# Patient Record
Sex: FEMALE | Race: WHITE | NOT HISPANIC OR LATINO | URBAN - METROPOLITAN AREA
[De-identification: names, ages, dates, MRNs, and addresses within clinical notes are randomized per-mention and may not be internally consistent; named-entity substitution may affect disease eponyms.]

---

## 2023-08-07 ENCOUNTER — APPOINTMENT (OUTPATIENT)
Dept: LAB | Facility: LAB | Age: 23
End: 2023-08-07
Payer: COMMERCIAL

## 2023-11-16 ENCOUNTER — TELEPHONE (OUTPATIENT)
Dept: HEMATOLOGY/ONCOLOGY | Facility: HOSPITAL | Age: 23
End: 2023-11-16
Payer: COMMERCIAL

## 2023-11-16 NOTE — TELEPHONE ENCOUNTER
RN called patient and left message on voice mail for her to call back. Call back instruction reviewed.

## 2023-11-22 ENCOUNTER — APPOINTMENT (OUTPATIENT)
Dept: HEMATOLOGY/ONCOLOGY | Facility: HOSPITAL | Age: 23
End: 2023-11-22
Payer: COMMERCIAL

## 2024-07-19 ENCOUNTER — ALLIED HEALTH (OUTPATIENT)
Dept: INTEGRATIVE MEDICINE | Facility: HOSPITAL | Age: 24
End: 2024-07-19
Payer: COMMERCIAL

## 2024-07-19 VITALS — SYSTOLIC BLOOD PRESSURE: 127 MMHG | TEMPERATURE: 98.2 F | DIASTOLIC BLOOD PRESSURE: 79 MMHG | HEART RATE: 93 BPM

## 2024-07-19 DIAGNOSIS — G90.A POTS (POSTURAL ORTHOSTATIC TACHYCARDIA SYNDROME): ICD-10-CM

## 2024-07-19 DIAGNOSIS — K31.84 GASTROPARESIS: ICD-10-CM

## 2024-07-19 DIAGNOSIS — R01.1 MURMUR, CARDIAC: ICD-10-CM

## 2024-07-19 DIAGNOSIS — E86.1 HYPOVOLEMIA: ICD-10-CM

## 2024-07-19 DIAGNOSIS — R11.10 CHRONIC VOMITING: ICD-10-CM

## 2024-07-19 DIAGNOSIS — K31.89 GASTRIC DYSMOTILITY: ICD-10-CM

## 2024-07-19 DIAGNOSIS — K21.9 CHRONIC GASTROESOPHAGEAL REFLUX DISEASE: ICD-10-CM

## 2024-07-19 DIAGNOSIS — J30.9 CHRONIC ALLERGIC RHINITIS: ICD-10-CM

## 2024-07-19 DIAGNOSIS — G90.1 DYSAUTONOMIA (MULTI): ICD-10-CM

## 2024-07-19 DIAGNOSIS — R63.39 FEEDING INTOLERANCE: ICD-10-CM

## 2024-07-19 DIAGNOSIS — Z91.018 FOOD ALLERGY: ICD-10-CM

## 2024-07-19 DIAGNOSIS — R11.0 CHRONIC NAUSEA: ICD-10-CM

## 2024-07-19 DIAGNOSIS — R51.9 HEADACHE, CHRONIC DAILY: ICD-10-CM

## 2024-07-19 DIAGNOSIS — K21.9 GASTROESOPHAGEAL REFLUX DISEASE WITHOUT ESOPHAGITIS: Primary | ICD-10-CM

## 2024-07-19 DIAGNOSIS — G93.32 CHRONIC FATIGUE SYNDROME: ICD-10-CM

## 2024-07-19 DIAGNOSIS — D89.40 MAST CELL ACTIVATION SYNDROME (MULTI): ICD-10-CM

## 2024-07-19 DIAGNOSIS — Q79.60 EHLERS-DANLOS SYNDROME (HHS-HCC): ICD-10-CM

## 2024-07-19 DIAGNOSIS — Z95.828 PORT-A-CATH IN PLACE: ICD-10-CM

## 2024-07-19 PROCEDURE — 99205 OFFICE O/P NEW HI 60 MIN: CPT | Performed by: PEDIATRICS

## 2024-07-19 PROCEDURE — 99215 OFFICE O/P EST HI 40 MIN: CPT | Performed by: PEDIATRICS

## 2024-07-19 RX ORDER — LIDOCAINE AND PRILOCAINE 25; 25 MG/G; MG/G
1 CREAM TOPICAL
COMMUNITY
Start: 2024-04-23

## 2024-07-19 RX ORDER — AZELASTINE HYDROCHLORIDE, FLUTICASONE PROPIONATE 137; 50 UG/1; UG/1
1 SPRAY, METERED NASAL 2 TIMES DAILY
COMMUNITY
Start: 2024-07-02

## 2024-07-19 RX ORDER — ONDANSETRON 4 MG/1
4 TABLET, ORALLY DISINTEGRATING ORAL EVERY 8 HOURS PRN
COMMUNITY
Start: 2023-03-08

## 2024-07-19 RX ORDER — ALBUTEROL SULFATE 90 UG/1
2 AEROSOL, METERED RESPIRATORY (INHALATION) EVERY 4 HOURS PRN
COMMUNITY
Start: 2024-04-23

## 2024-07-19 RX ORDER — PROMETHAZINE HYDROCHLORIDE 12.5 MG/1
12.5 TABLET ORAL EVERY 6 HOURS PRN
COMMUNITY
Start: 2023-05-17

## 2024-07-19 RX ORDER — BUDESONIDE AND FORMOTEROL FUMARATE DIHYDRATE 80; 4.5 UG/1; UG/1
2 AEROSOL RESPIRATORY (INHALATION) 2 TIMES DAILY
COMMUNITY
Start: 2024-07-02

## 2024-07-19 RX ORDER — IBUPROFEN 200 MG
800 TABLET ORAL EVERY 6 HOURS PRN
COMMUNITY

## 2024-07-19 RX ORDER — EPINEPHRINE 0.3 MG/.3ML
0.3 INJECTION SUBCUTANEOUS DAILY PRN
COMMUNITY
Start: 2023-07-05

## 2024-07-19 RX ORDER — CETIRIZINE HYDROCHLORIDE 10 MG/1
10 TABLET ORAL
COMMUNITY

## 2024-07-19 RX ORDER — ACETAMINOPHEN 500 MG
1000 TABLET ORAL EVERY 6 HOURS PRN
COMMUNITY

## 2024-07-19 RX ORDER — DOCUSATE SODIUM 100 MG/1
100 CAPSULE, LIQUID FILLED ORAL 2 TIMES DAILY PRN
COMMUNITY

## 2024-07-19 RX ORDER — MILK THISTLE 150 MG
1000 CAPSULE ORAL
COMMUNITY

## 2024-07-19 RX ORDER — HYDROCORTISONE 1 G/100G
1 CREAM TOPICAL DAILY PRN
COMMUNITY

## 2024-07-22 PROBLEM — Z96.7 FIXATION HARDWARE IN SPINE: Status: ACTIVE | Noted: 2024-07-22

## 2024-07-22 PROBLEM — R11.0 CHRONIC NAUSEA: Status: ACTIVE | Noted: 2024-07-22

## 2024-07-22 PROBLEM — Z98.1 HX OF SPINAL FUSION: Status: ACTIVE | Noted: 2018-03-08

## 2024-07-22 PROBLEM — M25.569 KNEE PAIN: Status: ACTIVE | Noted: 2024-07-22

## 2024-07-22 PROBLEM — M35.9 CONNECTIVE TISSUE DISORDER (MULTI): Status: ACTIVE | Noted: 2024-07-22

## 2024-07-22 PROBLEM — K21.9 CHRONIC GASTROESOPHAGEAL REFLUX DISEASE: Status: ACTIVE | Noted: 2024-07-22

## 2024-07-22 PROBLEM — E73.9 LACTOSE INTOLERANCE: Status: ACTIVE | Noted: 2024-07-22

## 2024-07-22 PROBLEM — K90.41 GLUTEN INTOLERANCE: Status: ACTIVE | Noted: 2024-07-22

## 2024-07-22 PROBLEM — L50.8 CHRONIC URTICARIA: Status: ACTIVE | Noted: 2021-12-28

## 2024-07-22 PROBLEM — Z91.018 FOOD ALLERGY: Status: ACTIVE | Noted: 2024-07-22

## 2024-07-22 PROBLEM — M25.551 HIP PAIN, BILATERAL: Status: ACTIVE | Noted: 2024-07-22

## 2024-07-22 PROBLEM — Z95.828 PORT-A-CATH IN PLACE: Status: ACTIVE | Noted: 2024-07-22

## 2024-07-22 PROBLEM — R51.9 HEADACHE, CHRONIC DAILY: Status: ACTIVE | Noted: 2024-07-22

## 2024-07-22 PROBLEM — G43.109 MIGRAINE WITH AURA: Status: ACTIVE | Noted: 2024-07-22

## 2024-07-22 PROBLEM — K59.89 GENERALIZED INTESTINAL DYSMOTILITY: Status: ACTIVE | Noted: 2024-07-22

## 2024-07-22 PROBLEM — M54.2 NECK PAIN: Status: ACTIVE | Noted: 2024-07-22

## 2024-07-22 PROBLEM — M25.552 HIP PAIN, BILATERAL: Status: ACTIVE | Noted: 2024-07-22

## 2024-07-22 PROBLEM — R11.10 CHRONIC VOMITING: Status: ACTIVE | Noted: 2024-07-22

## 2024-07-22 PROBLEM — G90.1 DYSAUTONOMIA (MULTI): Status: ACTIVE | Noted: 2024-07-22

## 2024-07-22 PROBLEM — K31.89 GASTRIC DYSMOTILITY: Status: ACTIVE | Noted: 2024-07-22

## 2024-07-22 PROBLEM — M54.9 BACK PAIN: Status: ACTIVE | Noted: 2024-07-22

## 2024-07-22 PROBLEM — R01.1 MURMUR, CARDIAC: Status: ACTIVE | Noted: 2021-12-15

## 2024-07-22 PROBLEM — J30.89 ALLERGIC RHINITIS DUE TO DUST MITE: Status: ACTIVE | Noted: 2021-12-28

## 2024-07-22 PROBLEM — G90.A POTS (POSTURAL ORTHOSTATIC TACHYCARDIA SYNDROME): Status: ACTIVE | Noted: 2022-11-09

## 2024-07-22 PROBLEM — J30.9 CHRONIC ALLERGIC RHINITIS: Status: ACTIVE | Noted: 2024-07-22

## 2024-07-22 PROBLEM — E86.1 HYPOVOLEMIA: Status: ACTIVE | Noted: 2024-07-22

## 2024-07-22 PROBLEM — J45.909 ASTHMA (HHS-HCC): Status: ACTIVE | Noted: 2024-07-22

## 2024-07-22 PROBLEM — Q79.60 EHLERS-DANLOS SYNDROME (HHS-HCC): Status: ACTIVE | Noted: 2022-10-01

## 2024-07-22 PROBLEM — J30.81 ALLERGIC RHINITIS DUE TO ANIMAL HAIR AND DANDER: Status: ACTIVE | Noted: 2021-12-28

## 2024-07-22 PROBLEM — G93.32 CHRONIC FATIGUE SYNDROME: Status: ACTIVE | Noted: 2024-07-22

## 2024-07-22 PROBLEM — D89.40 MAST CELL ACTIVATION SYNDROME (MULTI): Status: ACTIVE | Noted: 2024-07-22

## 2024-07-22 NOTE — ASSESSMENT & PLAN NOTE
For this and postexertional malaise consider low-dose naltrexone.  Also consider pacing as a daily practice.  See information below for both.    Information on Pacing:     http://www.cfsselfhelp.org/pacing-tutorial,   http://www.meaction.net/wp-content/uploads/2021/02/Pacing-and-Management-Guide-for-ME_CFS-9.pdf.     Apps:     https://www.[employer]/#:~:text=Visible%20is%20designed%20for%20people,may%20still%20find%20Visible%20useful.     https://SelStor.[employer]/store/apps/details?id=au.org.emerge.pacing&hl=en_SG        Pacing research:     Inna ARIAS, Yevgeniy MORA, Richy DUMONT, Millie N, Nam AR, Deon BK, Laxmi CX. The effectiveness of activity pacing interventions for people with chronic fatigue syndrome: a systematic review and meta-analysis. Disabil Rehabil. 2022 Nov 8:1-15. doi: 10.1080/29792075.2022.7851153. Epub ahead of print. PMID: 25896610.     From < https://pubmed.ncbi.nlm.nih.gov/67487909/>     Jesus WARD, taiwo Mims I, Renetta J, Taiwo Beck D, Yefri G, Junior LEDBETTER, Norma DUMONT. Activity Pacing Self-Management in Chronic Fatigue Syndrome: A Randomized Controlled Trial. Am J Occup Ther. 2015 Sep-Oct;69(5):1362146071. doi: 10.5014/ajot.2015.521396. PMID: 18465479; PMCID: QZG9341746.     From < https://www.ncbi.nlm.nih.gov/pmc/articles/MMU9015738/>     Alli KATE, Norma DUMONT, Bebeto SAWYER, Frances CAVAZOS. Pacing as a strategy to improve energy management in myalgic encephalomyelitis/chronic fatigue syndrome: a consensus document. Disabil Rehabil. 2012;34(13):1140-7. doi: 10.3109/38274122.2011.863052. Epub 2011 Dec 19. PMID: 89062716.     From < https://pubmed.ncbi.nlm.nih.gov/56893857/>     Information on LDN:  LDN low dose naltrexone research https://ldnresearchtrust.org/    "  https://www.ncbi.nlm.nih.gov/pmc/articles/DIH2509431/  https://www.ncbi.nlm.nih.gov/pmc/articles/OTD3003224/  https://www.ncbi.nlm.nih.gov/pmc/articles/BZD26860758/  https://www.ncbi.nlm.nih.gov/pmc/articles/JEW23667662/  https://www.jpain.org/article/-8400(87)29761-4/fulltext     LDN Dosing Instructions:   Start with 1/2 tablet by mouth at night before bed.  If there are no negative side-effects after 5 days, increase to 1 mg.  If again no negative side effects for 5 days, increase to 1.5mg, and so on, up to about 4.5mg.  What we're looking for, in addition to no problems, is improvements.  If slight improvements are noticed at one dose, but \"could be better\", keep going up.  At some point, patients, for whom this is effective, find a dose where they max out on improvements.  For example, you're at 3.0mg and doing well, so you go to 3.5mg and it's about the same.  In that case, we go back down to 3.0mg and that would be the dose we'd stay at for a while (total duration TBD).  If at any point in the process there are negative side effects on a dose (nausea, loose stool, anxiety, etc.), stay on that same dose until those go away.  Usually, those clear up in about 1-2 weeks.  The pharmacy in [place]:  https://www.[employer]/   [employer]  www.[employer] Data.[employer] is your national source for compounding medication, education, and consultation for anti-aging and integrative therapies.   [phone]  [phone]      "

## 2024-07-22 NOTE — PROGRESS NOTES
Subjective   Patient ID:     I had the pleasure of meeting Osiris today who is a 23-year-old young woman presenting by herself.  She recently moved to the area from [place], [place] and is establishing a care team here.  She has a long history of symptoms of dysautonomia, feeding intolerance, chronic fatigue, food intolerance, histamine intolerance, and hypermobility.  She has dislocated numerous joints over the years and has chronic pain.  She gets pain in multiple parts of her body frequently including back, hips, knees, ankles, and other major weightbearing joints.  She suffers from chronic nausea and likely gastroparesis as well as histamine intolerance with chronic urticaria.  Gastroesophageal reflux has been an issue that is also not been solved previously by antacids.  She uses Zofran frequently for the nausea which is helpful but not helpful enough.  She has tried Emend in the past without relief.    She does have a central line catheter in place through which she runs IV fluids.  She will typically run about 3-6 bags per week.  At most she will run 2 L a day but this is on a rare occasion.  She does feel better with greater fluid intake but worries about the safety of her port and maintaining its integrity.  She does not tolerate gluten, dairy, or soy.  Tree nuts are also an allergy and foods high in histamine lead to even greater abdominal pain.  She has been using [employer] formula sips throughout the day to try to maintain enough calorie intake to maintain body weight, but has been losing weight as of late.    She also suffers from chronic daily headache which is likely related to the dehydration and myofascial dysfunction.  She does supplement with vitamin D at 2000 IUs, vitamin B12 at 1,000 mcg, and vitamin C at 2000 mg.  Body temperature tends to run cold, energy low, and she does describe both chronic fatigue and postexertional malaise.  She has dizziness consistent with orthostatic origin.  She  does pass bowel movements.    We reviewed prior medication attempts.  She has never tried low-dose naltrexone and we discussed initiating this.  She will review the information and consider this.  Midodrine in the past led to hives.  Corlanor was not helpful.  She does not believe that fludrocortisone was helpful.    Also during the exam today it was noted that she has a considerable cardiac murmur being approximately a 3 out of 6.  She had a normal chest x-ray as recently as February 2024.  She had a 2D cardiac echo done in March 2023.  That report was minimally revealing:    Study Date: 03/14/2023 07:08 PM     Summary:   Overall left ventricular ejection fraction is estimated to be 60-65%.   Left ventricular wall motion is normal.   Left ventricular systolic function is normal. (LVEF>/=55%)   Right ventricular systolic pressure is normal at <35 mmHg.     Reason for Study: POTS (postural orthostatic tachycardia syndrome) [G90.A   (ICD-10-CM)].     Procedure: 2D Echo with Doppler and color flow (17356).     Overall, the etiology of the murmur appears poorly characterized to the availability of this medical team and will place a cardiology referral for further clarity.  A prior note did document increased velocities in the left ventricular outflow tract with normal valvular anatomy.  Given the volume of the murmur, however connection with cardiology here would be preferred.    We will begin to work on a supportive, comprehensive plan to address multiple aspects of the medical condition.  Osiris is connected with the Eben-Danlos support group.    [facility]:  [facility] [phone]    Objective   Physical Exam  Constitutional:       Appearance: Normal appearance.      Comments: Very fatigued appearing.  Not feeling well.   HENT:      Head: Normocephalic and atraumatic.      Nose: Nose normal.   Eyes:      Pupils: Pupils are equal, round, and reactive to light.   Cardiovascular:      Rate and Rhythm: Normal rate  and regular rhythm.      Heart sounds: Murmur heard.      Crescendo decrescendo systolic murmur is present with a grade of 3/6.      No friction rub. No gallop.      Comments: Aortic region  Pulmonary:      Effort: Pulmonary effort is normal.      Breath sounds: Normal breath sounds. No wheezing or rales.   Abdominal:      General: Abdomen is flat. Bowel sounds are normal.      Palpations: Abdomen is soft. There is no mass.   Musculoskeletal:         General: Normal range of motion.      Cervical back: Normal range of motion and neck supple.      Right lower leg: No edema.      Left lower leg: No edema.   Skin:     General: Skin is warm and dry.             Comments: Port in place   Neurological:      General: No focal deficit present.      Mental Status: She is alert.      Cranial Nerves: No cranial nerve deficit.      Motor: No weakness.      Gait: Gait normal.   Psychiatric:         Behavior: Behavior normal.         Thought Content: Thought content normal.         Assessment/Plan   Problem List Items Addressed This Visit             ICD-10-CM    Chronic allergic rhinitis J30.9    Chronic fatigue syndrome G93.32     For this and postexertional malaise consider low-dose naltrexone.  Also consider pacing as a daily practice.  See information below for both.    Information on Pacing:     http://www.cfsselfhelp.org/pacing-tutorial,   http://www.meaction.net/wp-content/uploads/2021/02/Pacing-and-Management-Guide-for-ME_CFS-9.pdf.     Apps:     https://www.Mogad/#:~:text=Visible%20is%20designed%20for%20people,may%20still%20find%20Visible%20useful.     https://Paktor.[employer]-Klub/store/apps/details?id=au.org.emerge.pacing&hl=en_SG        Pacing research:     Inna S, Yevgeniy MORA, Richy J, Millie N, Nam AR, Deon BK, Laxmi CX. The effectiveness of activity pacing interventions for people with chronic fatigue syndrome: a systematic review and meta-analysis. Disabil Rehabil. 2022 Nov 8:1-15. doi:  "10.1080/69464304.2022.2474796. Epub ahead of print. PMID: 39186557.     From < https://pubmed.ncbi.nlm.nih.gov/47040079/>     Jesus WARD, taiwo Mims I, Renetta DUMONT, Taiwo WARD, Yefri NOBLE, Junior LEDBETTER, Norma DUMONT. Activity Pacing Self-Management in Chronic Fatigue Syndrome: A Randomized Controlled Trial. Am J Occup Ther. 2015 Sep-Oct;69(5):6929657991. doi: 10.5014/ajot.2015.192647. PMID: 21548276; PMCID: NVV6652005.     From < https://www.ncbi.nlm.nih.gov/pmc/articles/XFY2861958/>     Alli KATE, Norma DUMONT, Bebeto LA, Frances KE. Pacing as a strategy to improve energy management in myalgic encephalomyelitis/chronic fatigue syndrome: a consensus document. Disabil Rehabil. 2012;34(13):1140-7. doi: 10.3109/18371866.2011.061050. Epub 2011 Dec 19. PMID: 06679596.     From < https://pubmed.ncbi.nlm.nih.gov/23989997/>     Information on LDN:  LDN low dose naltrexone research https://ldnresearchtrust.org/     https://www.ncbi.nlm.nih.gov/pmc/articles/QKW8672803/  https://www.ncbi.nlm.nih.gov/pmc/articles/ZSJ9544999/  https://www.ncbi.nlm.nih.gov/pmc/articles/AZI44287094/  https://www.ncbi.nlm.nih.gov/pmc/articles/JEL33026436/  https://www.jpain.org/article/-3911(15)44421-2/fulltext     LDN Dosing Instructions:   Start with 1/2 tablet by mouth at night before bed.  If there are no negative side-effects after 5 days, increase to 1 mg.  If again no negative side effects for 5 days, increase to 1.5mg, and so on, up to about 4.5mg.  What we're looking for, in addition to no problems, is improvements.  If slight improvements are noticed at one dose, but \"could be better\", keep going up.  At some point, patients, for whom this is effective, find a dose where they max out on improvements.  For example, you're at 3.0mg and doing well, so you go to 3.5mg and it's about the same.  In that case, we go back down to 3.0mg and that would be the dose we'd stay at for a while (total duration TBD).  If at any point in the process there are negative " side effects on a dose (nausea, loose stool, anxiety, etc.), stay on that same dose until those go away.  Usually, those clear up in about 1-2 weeks.  The pharmacy in [place]:  https://www.[employer]/   [employer]  www.[employer] is your national source for compounding medication, education, and consultation for anti-aging and integrative therapies.   [phone]  [phone]             Chronic nausea R11.0    Gastric dysmotility K31.89     1.  Continue IV infusions with normal saline.  We may want to consider increasing the total volume used.    2.  We will try IV Zofran to help with nausea and general feeding tolerance.    3.  Dr. Art to check with home health regarding [employer] availability.    4.  We will consider an NG tube to allow for continuous low-level feeds to increase total daily calorie consumption.    5.  Please obtain the Chinese herbs ordered today from [employer].  The formula is Israel Yip Keenan Wan.  We will use 2 pills, 3x/day to start ([facility] brand).  We may need higher dosing.         Chronic gastroesophageal reflux disease K21.9    Chronic vomiting R11.10    Dysautonomia (Multi) G90.1    Eben-Danlos syndrome ([facility]) Q79.60    Port-A-Cath in place Z95.828    Food allergy Z91.018    Headache, chronic daily R51.9     There are likely aspects of myofascial pain involved in the headaches.  We may be able to address these utilizing hands-on techniques and myofascial work such as with Raz Tello.         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  A more recent evaluation would be most conservative given the volume of the heart murmur.         POTS (postural orthostatic tachycardia syndrome) G90.A     Other Visit Diagnoses         Codes    Gastroesophageal reflux disease without esophagitis     -  Primary K21.9    Relevant Orders    FL guided NG tube placement    Feeding intolerance     R63.39    Relevant Orders    FL guided NG tube placement    Gastroparesis     K31.84    Relevant Orders    FL guided NG tube placement                 Deepak Art MD, LAc 07/22/24 10:49 AM

## 2024-07-22 NOTE — ASSESSMENT & PLAN NOTE
1.  Continue IV infusions with normal saline.  We may want to consider increasing the total volume used.    2.  We will try IV Zofran to help with nausea and general feeding tolerance.    3.  Dr. Art to check with home health regarding [employer] availability.    4.  We will consider an NG tube to allow for continuous low-level feeds to increase total daily calorie consumption.    5.  Please obtain the Chinese herbs ordered today from [employer]  The formula is Israel Chavarria.  We will use 2 pills, 3x/day to start ([facility] brand).  We may need higher dosing.

## 2024-07-22 NOTE — ASSESSMENT & PLAN NOTE
There are likely aspects of myofascial pain involved in the headaches.  We may be able to address these utilizing hands-on techniques and myofascial work such as with Raz Santiago

## 2024-07-22 NOTE — ASSESSMENT & PLAN NOTE
Referral to cardiology here [facility].  A more recent evaluation would be most conservative given the volume of the heart murmur.

## 2024-08-16 ENCOUNTER — APPOINTMENT (OUTPATIENT)
Dept: INTEGRATIVE MEDICINE | Facility: HOSPITAL | Age: 24
End: 2024-08-16
Payer: COMMERCIAL

## 2024-08-16 DIAGNOSIS — G90.A POTS (POSTURAL ORTHOSTATIC TACHYCARDIA SYNDROME): ICD-10-CM

## 2024-08-16 DIAGNOSIS — R11.0 CHRONIC NAUSEA: Primary | ICD-10-CM

## 2024-08-16 DIAGNOSIS — K31.89 GASTRIC DYSMOTILITY: ICD-10-CM

## 2024-08-16 PROCEDURE — 99214 OFFICE O/P EST MOD 30 MIN: CPT | Mod: 95 | Performed by: PEDIATRICS

## 2024-08-16 PROCEDURE — 99214 OFFICE O/P EST MOD 30 MIN: CPT | Performed by: PEDIATRICS

## 2024-08-16 RX ORDER — APREPITANT 125MG-80MG
125 KIT ORAL DAILY
Qty: 2 KIT | Refills: 2 | Status: SHIPPED | OUTPATIENT
Start: 2024-08-16 | End: 2024-08-17

## 2024-08-16 RX ORDER — SCOLOPAMINE TRANSDERMAL SYSTEM 1 MG/1
1 PATCH, EXTENDED RELEASE TRANSDERMAL
Qty: 10 PATCH | Refills: 2 | Status: SHIPPED | OUTPATIENT
Start: 2024-08-16 | End: 2024-11-14

## 2024-08-21 NOTE — PROGRESS NOTES
Subjective   Patient ID:     Met with Osiris today for a check-in and to coordinate care.  We were able to connect with gastroenterology who set up an appointment for an NG tube visit on August 30.  There is another visit that came up as well with a gastroenterologist through [facility], but she will delay that given a likely insurance denial if this service is considered redundant.  Home health was contacted as well to increase the amount of daily fluids from 1 L to 2.  We will also do 1 L of D5 half-normal to provide some glucose content.  We are working as well on getting a multivitamin rider organized for every other day.  About 1 pound per week is being lost currently due to lack of calorie consumption.    We discussed nausea control and scopolamine was helpful in the past to some degree and so she would like to try that again.  We also discussed the use of Emend and an order for that was placed.      Objective   Physical Exam mostly deferred due to telehealth.  Osiris was present throughout the visit, attentive, and engaged to help consider treatment planning.  Complexion was clear, but pale.    Assessment/Plan   Problem List Items Addressed This Visit             ICD-10-CM    Chronic nausea - Primary R11.0     Follow-up with gastroenterology as scheduled for an NG tube visit.    We will add in a multivitamin rider every other day as well to help support nutrition.    Restart the scopolamine patch to see if this can help with symptoms.    Begin Emend when available to see if this is effective as well.         Relevant Medications    scopolamine (Transderm-Scop) 1 mg over 3 days patch 3 day    Gastric dysmotility K31.89    Relevant Medications    scopolamine (Transderm-Scop) 1 mg over 3 days patch 3 day    POTS (postural orthostatic tachycardia syndrome) G90.A     We will increase IV fluids to 2 bags per day and noted that the preferable run speed was about 125 mL/h.  One of the vaginal be normal  saline and the other D5 half-normal.                 Deepak Art MD, LAc 08/21/24 11:55 AM

## 2024-08-21 NOTE — ASSESSMENT & PLAN NOTE
Follow-up with gastroenterology as scheduled for an NG tube visit.    We will add in a multivitamin rider every other day as well to help support nutrition.    Restart the scopolamine patch to see if this can help with symptoms.    Begin Emend when available to see if this is effective as well.

## 2024-08-21 NOTE — ASSESSMENT & PLAN NOTE
We will increase IV fluids to 2 bags per day and noted that the preferable run speed was about 125 mL/h.  One of the vaginal be normal saline and the other D5 half-normal.

## 2024-08-30 ENCOUNTER — LAB (OUTPATIENT)
Dept: LAB | Facility: LAB | Age: 24
End: 2024-08-30
Payer: COMMERCIAL

## 2024-08-30 ENCOUNTER — HOSPITAL ENCOUNTER (OUTPATIENT)
Dept: RADIOLOGY | Facility: CLINIC | Age: 24
Discharge: HOME | End: 2024-08-30
Payer: COMMERCIAL

## 2024-08-30 ENCOUNTER — OFFICE VISIT (OUTPATIENT)
Dept: GASTROENTEROLOGY | Facility: CLINIC | Age: 24
End: 2024-08-30
Payer: COMMERCIAL

## 2024-08-30 VITALS — HEART RATE: 91 BPM | TEMPERATURE: 99.5 F | SYSTOLIC BLOOD PRESSURE: 118 MMHG | DIASTOLIC BLOOD PRESSURE: 80 MMHG

## 2024-08-30 DIAGNOSIS — E44.0 MODERATE PROTEIN-CALORIE MALNUTRITION (MULTI): ICD-10-CM

## 2024-08-30 DIAGNOSIS — Q79.60 EHLERS-DANLOS DISEASE (HHS-HCC): ICD-10-CM

## 2024-08-30 DIAGNOSIS — K31.89 GASTRIC DYSMOTILITY: ICD-10-CM

## 2024-08-30 DIAGNOSIS — E44.0 MODERATE PROTEIN-CALORIE MALNUTRITION (MULTI): Primary | ICD-10-CM

## 2024-08-30 LAB — HCG UR QL IA.RAPID: NEGATIVE

## 2024-08-30 PROCEDURE — 81025 URINE PREGNANCY TEST: CPT

## 2024-08-30 PROCEDURE — 74018 RADEX ABDOMEN 1 VIEW: CPT

## 2024-08-30 PROCEDURE — 74018 RADEX ABDOMEN 1 VIEW: CPT | Performed by: RADIOLOGY

## 2024-08-30 PROCEDURE — 99215 OFFICE O/P EST HI 40 MIN: CPT | Performed by: NURSE PRACTITIONER

## 2024-08-30 PROCEDURE — 99205 OFFICE O/P NEW HI 60 MIN: CPT | Performed by: NURSE PRACTITIONER

## 2024-08-30 RX ORDER — CLOBETASOL PROPIONATE 0.5 MG/G
OINTMENT TOPICAL 2 TIMES DAILY
COMMUNITY
Start: 2024-08-21

## 2024-08-30 RX ORDER — MUPIROCIN 20 MG/G
OINTMENT TOPICAL 3 TIMES DAILY
COMMUNITY
Start: 2024-08-21

## 2024-08-30 ASSESSMENT — ENCOUNTER SYMPTOMS
NAUSEA: 1
PSYCHIATRIC NEGATIVE: 1
RESPIRATORY NEGATIVE: 1
CARDIOVASCULAR NEGATIVE: 1
NEUROLOGICAL NEGATIVE: 1
ENDOCRINE NEGATIVE: 1
MUSCULOSKELETAL NEGATIVE: 1
ALLERGIC/IMMUNOLOGIC NEGATIVE: 1
CONSTITUTIONAL NEGATIVE: 1
HEMATOLOGIC/LYMPHATIC NEGATIVE: 1
EYES NEGATIVE: 1

## 2024-08-30 NOTE — PATIENT INSTRUCTIONS
Gastric dysmotility and persistent nausea-we have discussed the risks and benefits of an NG tube versus a PEG/J.  NG tube insertion for current nutrition and hydration and we will plan for PEG/J in future.  Please continue [employer] supplements via NG tube for nutrition I would recommend adding water as well for hydration in addition to using her port.  Please keep the NG tube in place with tape if you notice dislocation you may need to be seen in the emergency room for replacement do not use the tube if it has been displaced at as it increases your chance of aspiration into your lungs.    I will see you back for follow-up after your EGD/ PEG/J placement

## 2024-08-30 NOTE — PROGRESS NOTES
Subjective   Patient ID: Osiris Jessica is a 23 y.o. female who presents for Follow-up.  HPI  23-year-old female for new patient visit for NG tube placement  History includes chronic nausea and gastric dysmotility, POTS syndrome, eczema, asthma, Eben Danlos syndrome  She is followed by multiple specialties in [facility]  7/2/2024 labs reviewed IgE normal  3/12/2024 normal CBC, CMP  Iron 45  Ferritin 43  Right sided ort a cath  Has had an NG tube in a few years ago  Sips of water but no other liquids   Was doing [employer] peptide feeding  Has discussed PEG/J   Last drink was yesterday  Bm hard stool  IV fluids through her port and dextrose , mvi for nutrition  Stools softner     Patient did not bring supplies for NG tube placement that she has at home and was requested to bring for insertion, she will go home, get supplies and return to clinic today to get it inserted.  Patient returned at 12:15   Supplies obtained for Tay          Review of Systems   Constitutional: Negative.    HENT: Negative.     Eyes: Negative.    Respiratory: Negative.     Cardiovascular: Negative.    Gastrointestinal:  Positive for nausea.   Endocrine: Negative.    Genitourinary: Negative.    Musculoskeletal: Negative.    Skin: Negative.    Allergic/Immunologic: Negative.    Neurological: Negative.    Hematological: Negative.    Psychiatric/Behavioral: Negative.         Objective   Physical Exam  Constitutional:       Appearance: Normal appearance.   HENT:      Head: Normocephalic.      Nose: Nose normal.      Mouth/Throat:      Mouth: Mucous membranes are moist.   Eyes:      Pupils: Pupils are equal, round, and reactive to light.   Cardiovascular:      Rate and Rhythm: Normal rate and regular rhythm.      Pulses: Normal pulses.      Heart sounds: Normal heart sounds.   Pulmonary:      Effort: Pulmonary effort is normal.      Breath sounds: Normal breath sounds.   Abdominal:      General: Bowel sounds are normal.       Palpations: Abdomen is soft.   Musculoskeletal:         General: Normal range of motion.      Cervical back: Normal range of motion and neck supple.   Skin:     General: Skin is warm and dry.   Neurological:      Mental Status: She is alert.   Psychiatric:         Mood and Affect: Mood normal.     10 [place] NG tube placed up to 60 cm at the nares and taped in place, confirmation via air injection and sent for abdominal x-ray for confirmation- discussed x-ray with Dr. Alfie Lackey and tube is stomach    Assessment/Plan        Gastric dysmotility and persistent nausea-we have discussed the risks and benefits of an NG tube versus a PEG/J.  NG tube insertion for current nutrition and hydration and we will plan for PEG/J in future.  Please continue [employer] supplements via NG tube for nutrition I would recommend adding water as well for hydration in addition to using her port.  Please keep the NG tube in place with tape if you notice dislocation you may need to be seen in the emergency room for replacement do not use the tube if it has been displaced at as it increases your chance of aspiration into your lungs.    I will see you back for follow-up after your EGD/ PEG/J placement      MANASA Juan-CNP 08/30/24 8:00 AM

## 2024-09-03 RX ORDER — CHOLECALCIFEROL (VITAMIN D3) 50 MCG
50 TABLET ORAL DAILY
COMMUNITY

## 2024-09-03 RX ORDER — IBUPROFEN 100 MG/5ML
2000 SUSPENSION, ORAL (FINAL DOSE FORM) ORAL DAILY
COMMUNITY

## 2024-09-03 RX ORDER — LANOLIN ALCOHOL/MO/W.PET/CERES
1000 CREAM (GRAM) TOPICAL DAILY
COMMUNITY

## 2024-09-03 RX ORDER — POLYETHYLENE GLYCOL 3350 17 G/17G
17 POWDER, FOR SOLUTION ORAL DAILY PRN
COMMUNITY

## 2024-09-04 ENCOUNTER — HOSPITAL ENCOUNTER (OUTPATIENT)
Dept: GASTROENTEROLOGY | Facility: HOSPITAL | Age: 24
Discharge: HOME | End: 2024-09-04
Payer: COMMERCIAL

## 2024-09-04 ENCOUNTER — ANESTHESIA (OUTPATIENT)
Dept: GASTROENTEROLOGY | Facility: HOSPITAL | Age: 24
End: 2024-09-04
Payer: COMMERCIAL

## 2024-09-04 ENCOUNTER — ANESTHESIA EVENT (OUTPATIENT)
Dept: GASTROENTEROLOGY | Facility: HOSPITAL | Age: 24
End: 2024-09-04
Payer: COMMERCIAL

## 2024-09-04 VITALS
TEMPERATURE: 98.1 F | RESPIRATION RATE: 16 BRPM | OXYGEN SATURATION: 99 % | SYSTOLIC BLOOD PRESSURE: 112 MMHG | WEIGHT: 122.58 LBS | BODY MASS INDEX: 20.93 KG/M2 | HEIGHT: 64 IN | HEART RATE: 89 BPM | DIASTOLIC BLOOD PRESSURE: 69 MMHG

## 2024-09-04 DIAGNOSIS — K31.89 GASTRIC DYSMOTILITY: Primary | ICD-10-CM

## 2024-09-04 DIAGNOSIS — K31.89 GASTRIC DYSMOTILITY: ICD-10-CM

## 2024-09-04 DIAGNOSIS — Q79.60 EHLERS-DANLOS DISEASE (HHS-HCC): ICD-10-CM

## 2024-09-04 DIAGNOSIS — E44.0 MODERATE PROTEIN-CALORIE MALNUTRITION (MULTI): Primary | ICD-10-CM

## 2024-09-04 LAB — PREGNANCY TEST URINE, POC: NEGATIVE

## 2024-09-04 PROCEDURE — 7100000010 HC PHASE TWO TIME - EACH INCREMENTAL 1 MINUTE

## 2024-09-04 PROCEDURE — 2500000001 HC RX 250 WO HCPCS SELF ADMINISTERED DRUGS (ALT 637 FOR MEDICARE OP): Performed by: STUDENT IN AN ORGANIZED HEALTH CARE EDUCATION/TRAINING PROGRAM

## 2024-09-04 PROCEDURE — 2720000007 HC OR 272 NO HCPCS

## 2024-09-04 PROCEDURE — 81025 URINE PREGNANCY TEST: CPT | Performed by: INTERNAL MEDICINE

## 2024-09-04 PROCEDURE — 43246 EGD PLACE GASTROSTOMY TUBE: CPT | Performed by: INTERNAL MEDICINE

## 2024-09-04 PROCEDURE — 3700000001 HC GENERAL ANESTHESIA TIME - INITIAL BASE CHARGE

## 2024-09-04 PROCEDURE — 2500000004 HC RX 250 GENERAL PHARMACY W/ HCPCS (ALT 636 FOR OP/ED): Performed by: INTERNAL MEDICINE

## 2024-09-04 PROCEDURE — 7100000001 HC RECOVERY ROOM TIME - INITIAL BASE CHARGE

## 2024-09-04 PROCEDURE — 7100000009 HC PHASE TWO TIME - INITIAL BASE CHARGE

## 2024-09-04 PROCEDURE — 2500000005 HC RX 250 GENERAL PHARMACY W/O HCPCS: Performed by: NURSE ANESTHETIST, CERTIFIED REGISTERED

## 2024-09-04 PROCEDURE — 2500000001 HC RX 250 WO HCPCS SELF ADMINISTERED DRUGS (ALT 637 FOR MEDICARE OP): Performed by: NURSE ANESTHETIST, CERTIFIED REGISTERED

## 2024-09-04 PROCEDURE — 7100000002 HC RECOVERY ROOM TIME - EACH INCREMENTAL 1 MINUTE

## 2024-09-04 PROCEDURE — 2500000001 HC RX 250 WO HCPCS SELF ADMINISTERED DRUGS (ALT 637 FOR MEDICARE OP): Performed by: ANESTHESIOLOGY

## 2024-09-04 PROCEDURE — 2500000004 HC RX 250 GENERAL PHARMACY W/ HCPCS (ALT 636 FOR OP/ED): Performed by: NURSE ANESTHETIST, CERTIFIED REGISTERED

## 2024-09-04 PROCEDURE — 3700000002 HC GENERAL ANESTHESIA TIME - EACH INCREMENTAL 1 MINUTE

## 2024-09-04 PROCEDURE — 2500000004 HC RX 250 GENERAL PHARMACY W/ HCPCS (ALT 636 FOR OP/ED): Performed by: STUDENT IN AN ORGANIZED HEALTH CARE EDUCATION/TRAINING PROGRAM

## 2024-09-04 RX ORDER — SCOLOPAMINE TRANSDERMAL SYSTEM 1 MG/1
PATCH, EXTENDED RELEASE TRANSDERMAL AS NEEDED
Status: DISCONTINUED | OUTPATIENT
Start: 2024-09-04 | End: 2024-09-04

## 2024-09-04 RX ORDER — PROPOFOL 10 MG/ML
INJECTION, EMULSION INTRAVENOUS AS NEEDED
Status: DISCONTINUED | OUTPATIENT
Start: 2024-09-04 | End: 2024-09-04

## 2024-09-04 RX ORDER — MIDAZOLAM HYDROCHLORIDE 1 MG/ML
INJECTION INTRAMUSCULAR; INTRAVENOUS AS NEEDED
Status: DISCONTINUED | OUTPATIENT
Start: 2024-09-04 | End: 2024-09-04

## 2024-09-04 RX ORDER — ONDANSETRON HYDROCHLORIDE 2 MG/ML
4 INJECTION, SOLUTION INTRAVENOUS ONCE
Status: COMPLETED | OUTPATIENT
Start: 2024-09-04 | End: 2024-09-04

## 2024-09-04 RX ORDER — ONDANSETRON HYDROCHLORIDE 2 MG/ML
INJECTION, SOLUTION INTRAVENOUS AS NEEDED
Status: DISCONTINUED | OUTPATIENT
Start: 2024-09-04 | End: 2024-09-04

## 2024-09-04 RX ORDER — ACETAMINOPHEN 325 MG/1
650 TABLET ORAL EVERY 4 HOURS PRN
Status: DISCONTINUED | OUTPATIENT
Start: 2024-09-04 | End: 2024-09-04

## 2024-09-04 RX ORDER — ACETAMINOPHEN 160 MG/5ML
650 SUSPENSION ORAL EVERY 4 HOURS PRN
Status: DISCONTINUED | OUTPATIENT
Start: 2024-09-04 | End: 2024-09-05 | Stop reason: HOSPADM

## 2024-09-04 RX ORDER — OXYCODONE HYDROCHLORIDE 5 MG/1
10 TABLET ORAL EVERY 4 HOURS PRN
Status: DISCONTINUED | OUTPATIENT
Start: 2024-09-04 | End: 2024-09-05 | Stop reason: HOSPADM

## 2024-09-04 RX ORDER — LIDOCAINE HYDROCHLORIDE 10 MG/ML
0.1 INJECTION, SOLUTION EPIDURAL; INFILTRATION; INTRACAUDAL; PERINEURAL ONCE
Status: DISCONTINUED | OUTPATIENT
Start: 2024-09-04 | End: 2024-09-05 | Stop reason: HOSPADM

## 2024-09-04 RX ORDER — CEFAZOLIN SODIUM 2 G/100ML
INJECTION, SOLUTION INTRAVENOUS AS NEEDED
Status: DISCONTINUED | OUTPATIENT
Start: 2024-09-04 | End: 2024-09-04

## 2024-09-04 RX ORDER — SODIUM CHLORIDE, SODIUM LACTATE, POTASSIUM CHLORIDE, CALCIUM CHLORIDE 600; 310; 30; 20 MG/100ML; MG/100ML; MG/100ML; MG/100ML
20 INJECTION, SOLUTION INTRAVENOUS CONTINUOUS
Status: DISCONTINUED | OUTPATIENT
Start: 2024-09-04 | End: 2024-09-05 | Stop reason: HOSPADM

## 2024-09-04 RX ORDER — SODIUM CHLORIDE, SODIUM LACTATE, POTASSIUM CHLORIDE, CALCIUM CHLORIDE 600; 310; 30; 20 MG/100ML; MG/100ML; MG/100ML; MG/100ML
100 INJECTION, SOLUTION INTRAVENOUS CONTINUOUS
Status: DISCONTINUED | OUTPATIENT
Start: 2024-09-04 | End: 2024-09-05 | Stop reason: HOSPADM

## 2024-09-04 RX ORDER — SCOLOPAMINE TRANSDERMAL SYSTEM 1 MG/1
PATCH, EXTENDED RELEASE TRANSDERMAL
Status: DISPENSED
Start: 2024-09-04 | End: 2024-09-04

## 2024-09-04 RX ORDER — ACETAMINOPHEN 325 MG/1
650 TABLET ORAL EVERY 4 HOURS PRN
Status: DISCONTINUED | OUTPATIENT
Start: 2024-09-04 | End: 2024-09-05 | Stop reason: HOSPADM

## 2024-09-04 RX ORDER — FENTANYL CITRATE 50 UG/ML
INJECTION, SOLUTION INTRAMUSCULAR; INTRAVENOUS AS NEEDED
Status: DISCONTINUED | OUTPATIENT
Start: 2024-09-04 | End: 2024-09-04

## 2024-09-04 RX ORDER — OXYCODONE HYDROCHLORIDE 5 MG/1
5 TABLET ORAL EVERY 4 HOURS PRN
Status: DISCONTINUED | OUTPATIENT
Start: 2024-09-04 | End: 2024-09-05 | Stop reason: HOSPADM

## 2024-09-04 RX ORDER — ROCURONIUM BROMIDE 10 MG/ML
INJECTION, SOLUTION INTRAVENOUS AS NEEDED
Status: DISCONTINUED | OUTPATIENT
Start: 2024-09-04 | End: 2024-09-04

## 2024-09-04 RX ORDER — LIDOCAINE HYDROCHLORIDE 40 MG/ML
SOLUTION TOPICAL AS NEEDED
Status: DISCONTINUED | OUTPATIENT
Start: 2024-09-04 | End: 2024-09-04

## 2024-09-04 SDOH — HEALTH STABILITY: MENTAL HEALTH: CURRENT SMOKER: 0

## 2024-09-04 ASSESSMENT — PAIN SCALES - GENERAL
PAINLEVEL_OUTOF10: 5 - MODERATE PAIN
PAINLEVEL_OUTOF10: 0 - NO PAIN
PAINLEVEL_OUTOF10: 7
PAINLEVEL_OUTOF10: 6
PAINLEVEL_OUTOF10: 0 - NO PAIN
PAINLEVEL_OUTOF10: 5 - MODERATE PAIN
PAINLEVEL_OUTOF10: 5 - MODERATE PAIN
PAINLEVEL_OUTOF10: 6
PAINLEVEL_OUTOF10: 5 - MODERATE PAIN
PAINLEVEL_OUTOF10: 7
PAINLEVEL_OUTOF10: 0 - NO PAIN
PAINLEVEL_OUTOF10: 7
PAINLEVEL_OUTOF10: 5 - MODERATE PAIN
PAINLEVEL_OUTOF10: 7
PAINLEVEL_OUTOF10: 7
PAINLEVEL_OUTOF10: 5 - MODERATE PAIN

## 2024-09-04 ASSESSMENT — PAIN - FUNCTIONAL ASSESSMENT
PAIN_FUNCTIONAL_ASSESSMENT: 0-10

## 2024-09-04 ASSESSMENT — PAIN DESCRIPTION - LOCATION
LOCATION: ABDOMEN

## 2024-09-04 ASSESSMENT — COLUMBIA-SUICIDE SEVERITY RATING SCALE - C-SSRS
6. HAVE YOU EVER DONE ANYTHING, STARTED TO DO ANYTHING, OR PREPARED TO DO ANYTHING TO END YOUR LIFE?: NO
2. HAVE YOU ACTUALLY HAD ANY THOUGHTS OF KILLING YOURSELF?: NO
1. IN THE PAST MONTH, HAVE YOU WISHED YOU WERE DEAD OR WISHED YOU COULD GO TO SLEEP AND NOT WAKE UP?: NO

## 2024-09-04 NOTE — PERIOPERATIVE NURSING NOTE
Pt was able to ambulate to wheelchair with help, pt did not pass out. Pt states she feels dizzy but comfortable going home. Pt has a friend who will be staying with her. IV was removed and pt was safely transported downstairs and into her vehicle.

## 2024-09-04 NOTE — ANESTHESIA POSTPROCEDURE EVALUATION
Patient: Osiris Jessica    Procedure Summary       Date: 09/04/24 Room / Location: [facility]    Anesthesia Start: 1058 Anesthesia Stop: 1204    Procedure: EGD Diagnosis:       Moderate protein-calorie malnutrition (Multi)      Gastric dysmotility      Eben-Danlos disease ([facility])      Moderate protein-calorie malnutrition (Multi)    Scheduled Providers: Alfie Lackey MD; Marcus Sousa DO Responsible Provider: Marcus Sousa DO    Anesthesia Type: general ASA Status: 2            Anesthesia Type: general    Vitals Value Taken Time   /63 09/04/24 1216   Temp 36 °C (96.8 °F) 09/04/24 1157   Pulse 89 09/04/24 1224   Resp 16 09/04/24 1215   SpO2 100 % 09/04/24 1224   Vitals shown include unfiled device data.    Anesthesia Post Evaluation    Patient location during evaluation: PACU  Patient participation: complete - patient participated  Level of consciousness: awake and alert  Pain management: adequate  Airway patency: patent  Cardiovascular status: acceptable  Respiratory status: acceptable  Hydration status: acceptable  Postoperative Nausea and Vomiting: none        No notable events documented.

## 2024-09-04 NOTE — PERIOPERATIVE NURSING NOTE
1420 assuming care of pt at this time. Pt extremely sleepy, slowly responds to voice commands. Call light with in reach    1430Pt aware can sleep for 20 more minutes then we will work on getting her dressed, pt agreeable to plan. Pt requested to not have her friend back to [place] until she feels ready    1446 pt stated feels dizzy and needs to sit for awhile longer.  Pt given glasses and cell phone.     1520 pt stated she wasn't ready to get dressed and needed to sit at 90 degrees before even thinking about getting dressed.    1540 pt dressing with assist of RN at bedside, pt moving slowly, pt requested RN leave [facility].     1600 pt called RN to bedside, PEG/Jtube leaked all over bed and the floor. Floor cleaned, bed linens changed.     1625 pt ambulated to bathroom with assist of RN, PEG continued to leak as pt ambulated to restroom, bag placed over tube for collection    1630 Discharge instructions discussed via phone with patient designated person(s), all questions answered. Per pt friend, pt family lives 2 hours away and were not able to make it to surgery today, [facility]     [facility]40 pt ambulated back to bay after void, PEG leaking significant amount 20-30 ml collected, MD messaged via secure chat    1655 spoke with Dr Lackey, he requested line be clamped    1700 hospital wound care nurse gone for the day.    1710 attempt to clamp tube however tube stiff and clamp not working. , spoke with Dr Lackey and he is on his way to assess.    1730 new IV placed in right AC, pt tolerated well. Verbal order per Dr Lackey.     1733 pt given PO liquid tylenol 650mg for pain 5/10, pt agreeable to liquid tylenol for pain level, not due for oxycodone at this time    1735 pt friend at bedside    1745 Dr Lackey at bedside    1750 Jtube pulled per Dr Lackey, PEG bumper replaced, PEG clamped and re capped per HUSSEIN Saxena procedure RN also at  bedside. Pt aware to remain NPO, MD will call her in the am for add on J tube placement tomorrow 9/5/24 at [facility]    1757 Spoke with ADONIS Evans to administer PO oxycodone early to due bedside manipulation     1805 pt medicated with 5 mg oxycodone    1820 handoff to HERB Angeles RN

## 2024-09-04 NOTE — DISCHARGE INSTRUCTIONS

## 2024-09-04 NOTE — ANESTHESIA PROCEDURE NOTES
Peripheral IV  Date/Time: 9/4/2024 11:50 AM      Placement  Needle size: 18 G  Laterality: left  Location: antecubital  Local anesthetic: none  Site prep: alcohol  Technique: anatomical landmarks  Attempts: 1

## 2024-09-04 NOTE — SIGNIFICANT EVENT
Leaking noted from PEG Tube site at the insertion of the j tube extension.  I examined the tube site and skin appears normal.  It appears that the j tube has migrated and no clamp was placed.  The  j tube extension was removed and clamp was placed on the g tube portion. Will need the patient to come back for j tube extension. Will arrange for her.

## 2024-09-04 NOTE — ANESTHESIA PROCEDURE NOTES
Airway  Date/Time: 9/4/2024 11:11 AM  Urgency: elective    Airway not difficult    Staffing  Performed: CRNA   Authorized by: Marcus Sousa DO    Performed by: MANASA Roa-LIDIA  Patient location during procedure: OR    Indications and Patient Condition  Indications for airway management: anesthesia  Spontaneous ventilation: present  Sedation level: deep  Preoxygenated: yes  Patient position: sniffing  Mask difficulty assessment: 1 - vent by mask    Final Airway Details  Final airway type: endotracheal airway      Successful airway: ETT  Cuffed: yes   Successful intubation technique: direct laryngoscopy  Endotracheal tube insertion site: oral  Blade: Jose Luis  Blade size: #4  ETT size (mm): 7.0  Cormack-Lehane Classification: grade I - full view of glottis  Placement verified by: chest auscultation, bronchoscopy and capnometry   Measured from: teeth  ETT to teeth (cm): 22  Number of attempts at approach: 1

## 2024-09-04 NOTE — H&P
History Of Present Illness  Osiris Jessica is a 23 y.o. female presenting with gastric dysmotility.     Past Medical History  Past Medical History:   Diagnosis Date    Anemia     Asthma (HHS-HCC)     Delayed emergence from general anesthesia     Eben-Danlos syndrome (HHS-HCC)     Esophageal dysmotility     Gastroparesis     GERD (gastroesophageal reflux disease)      Surgical History  Past Surgical History:   Procedure Laterality Date    COLONOSCOPY      ESOPHAGEAL MOTILITY STUDY      ESOPHAGOGASTRODUODENOSCOPY      IR TUNNELED CENTRAL PORT PLACEMENT      right subclavian    LUMBAR FUSION      T2-L4    WISDOM TOOTH EXTRACTION       Social History  She reports that she has never smoked. She has never used smokeless tobacco. She reports that she does not drink alcohol and does not use drugs.    Family History  No family history on file.     Allergies  Allergies   Allergen Reactions    [place] Anaphylaxis, Angioedema, GI Upset, Hives, Itching, Unknown, Rash, Shortness of breath and Wheezing    Red Dye GI Upset, Hives, Itching, Nausea/vomiting and Rash    Tree Nuts Anaphylaxis, Other, Hives, Itching, Nausea/vomiting, Unknown, Rash and Shortness of breath    Allegra [Fexofenadine] Unknown    Animal Dander Itching, Unknown and Runny nose     Runny nose and sneezing   Cat dander confirmed by skin testing 07/02/24    Runny nose and sneezing    Ciprofloxacin Unknown    Egg Unknown, Diarrhea, GI intolerance and GI Upset    Gluten Unknown, Diarrhea, GI intolerance, Other and Nausea/vomiting    Grass Pollen Hives, Itching, Unknown and Runny nose     Confirmed with skin testing 07/02/24    House Dust Mite Itching, Runny nose and Wheezing    Midodrine Unknown    Milk Containing Products (Dairy) Diarrhea, Other, Unknown and Nausea/vomiting    Nortriptyline Unknown    Tree And Shrub Pollen Dermatitis, Hives, Itching, Runny nose and Other     Confirmed with skin testing 07/02/24    Chlorhexidine Hives, Itching and Rash    Chlorine  "Itching, Unknown, Rash and Runny nose    Mold Hives, Itching, Rash and Unknown     Confirmed with skin testing 07/02/24    Soy Diarrhea, Other, Hives, Itching, Nausea/vomiting and Rash    Weed Pollen Hives, Itching, Rash, Runny nose and Other     Confirmed with skin testing 07/02/24     Review of Systems     Physical Exam  Vitals and nursing note reviewed.   Constitutional:       Appearance: Normal appearance.   Cardiovascular:      Rate and Rhythm: Normal rate and regular rhythm.      Pulses: Normal pulses.      Heart sounds: Normal heart sounds.   Pulmonary:      Effort: Pulmonary effort is normal.      Breath sounds: Normal breath sounds.   Abdominal:      General: Abdomen is flat.      Palpations: Abdomen is soft.   Neurological:      Mental Status: She is alert.          Last Recorded Vitals  Blood pressure 130/78, pulse 85, temperature 36.2 °C (97.2 °F), temperature source Temporal, resp. rate 20, height 1.626 m (5' 4\"), weight 55.6 kg (122 lb 9.2 oz), SpO2 99%.    Assessment/Plan   Gastric dysmotility    Proceed with EGD and tube placement     Alfie Lackey MD  "

## 2024-09-04 NOTE — ANESTHESIA PREPROCEDURE EVALUATION
Patient: Osiris Jessica    Procedure Information       Date/Time: 09/04/24 1110    Scheduled providers: Alfie Lackey MD; Marcus Sousa DO    Procedure: EGD    Location: [facility]            Relevant Problems   Anesthesia (within normal limits)      Cardiac   (+) Murmur, cardiac      Pulmonary   (+) Asthma (HHS-HCC)      Neuro   (+) Headache, chronic daily      GI   (+) Chronic gastroesophageal reflux disease      Musculoskeletal   (+) Kyphoscoliosis and scoliosis       Clinical information reviewed:   Tobacco  Allergies  Meds   Med Hx  Surg Hx  OB Status  Fam Hx  Soc   Hx        NPO Detail:  NPO/Void Status  Carbohydrate Drink Given Prior to Surgery? : N  Date of Last Liquid: 09/03/24  Time of Last Liquid: 1700  Date of Last Solid: 09/02/24  Time of Last Solid: 0800  Last Intake Type: Clear fluids  Time of Last Void: 1000         Physical Exam    Airway  Mallampati: II  TM distance: >3 FB  Neck ROM: full     Cardiovascular - normal exam     Dental    Pulmonary - normal exam     Abdominal            Anesthesia Plan    History of general anesthesia?: yes  History of complications of general anesthesia?: no    ASA 2     general     The patient is not a current smoker.    intravenous induction   Anesthetic plan and risks discussed with patient.  Use of blood products discussed with patient who.    Plan discussed with CRNA and attending.

## 2024-09-05 ENCOUNTER — HOSPITAL ENCOUNTER (OUTPATIENT)
Dept: GASTROENTEROLOGY | Facility: HOSPITAL | Age: 24
Discharge: HOME | End: 2024-09-05
Payer: COMMERCIAL

## 2024-09-05 ENCOUNTER — ANESTHESIA EVENT (OUTPATIENT)
Dept: GASTROENTEROLOGY | Facility: HOSPITAL | Age: 24
End: 2024-09-05
Payer: COMMERCIAL

## 2024-09-05 ENCOUNTER — ANESTHESIA (OUTPATIENT)
Dept: GASTROENTEROLOGY | Facility: HOSPITAL | Age: 24
End: 2024-09-05
Payer: COMMERCIAL

## 2024-09-05 VITALS
BODY MASS INDEX: 20.33 KG/M2 | WEIGHT: 122 LBS | HEART RATE: 70 BPM | RESPIRATION RATE: 19 BRPM | DIASTOLIC BLOOD PRESSURE: 69 MMHG | TEMPERATURE: 97.3 F | OXYGEN SATURATION: 100 % | SYSTOLIC BLOOD PRESSURE: 116 MMHG | HEIGHT: 65 IN

## 2024-09-05 DIAGNOSIS — K31.89 GASTRIC DYSMOTILITY: ICD-10-CM

## 2024-09-05 PROBLEM — D64.9 ANEMIA: Status: ACTIVE | Noted: 2024-09-05

## 2024-09-05 LAB — PREGNANCY TEST URINE, POC: NEGATIVE

## 2024-09-05 PROCEDURE — 2720000007 HC OR 272 NO HCPCS

## 2024-09-05 PROCEDURE — 43235 EGD DIAGNOSTIC BRUSH WASH: CPT | Performed by: STUDENT IN AN ORGANIZED HEALTH CARE EDUCATION/TRAINING PROGRAM

## 2024-09-05 PROCEDURE — 43235 EGD DIAGNOSTIC BRUSH WASH: CPT | Performed by: INTERNAL MEDICINE

## 2024-09-05 PROCEDURE — 2780000003 HC OR 278 NO HCPCS

## 2024-09-05 PROCEDURE — 3700000001 HC GENERAL ANESTHESIA TIME - INITIAL BASE CHARGE

## 2024-09-05 PROCEDURE — 7100000010 HC PHASE TWO TIME - EACH INCREMENTAL 1 MINUTE

## 2024-09-05 PROCEDURE — 7100000009 HC PHASE TWO TIME - INITIAL BASE CHARGE

## 2024-09-05 PROCEDURE — 2500000004 HC RX 250 GENERAL PHARMACY W/ HCPCS (ALT 636 FOR OP/ED)

## 2024-09-05 PROCEDURE — 3700000002 HC GENERAL ANESTHESIA TIME - EACH INCREMENTAL 1 MINUTE

## 2024-09-05 PROCEDURE — 2500000001 HC RX 250 WO HCPCS SELF ADMINISTERED DRUGS (ALT 637 FOR MEDICARE OP): Performed by: ANESTHESIOLOGY

## 2024-09-05 RX ORDER — ACETAMINOPHEN 325 MG/1
650 TABLET ORAL EVERY 4 HOURS PRN
Status: DISCONTINUED | OUTPATIENT
Start: 2024-09-05 | End: 2024-09-06 | Stop reason: HOSPADM

## 2024-09-05 RX ORDER — SCOLOPAMINE TRANSDERMAL SYSTEM 1 MG/1
PATCH, EXTENDED RELEASE TRANSDERMAL AS NEEDED
Status: DISCONTINUED | OUTPATIENT
Start: 2024-09-05 | End: 2024-09-05

## 2024-09-05 RX ORDER — OXYCODONE HYDROCHLORIDE 5 MG/1
5 TABLET ORAL EVERY 4 HOURS PRN
Status: DISCONTINUED | OUTPATIENT
Start: 2024-09-05 | End: 2024-09-06 | Stop reason: HOSPADM

## 2024-09-05 RX ORDER — FENTANYL CITRATE 50 UG/ML
INJECTION, SOLUTION INTRAMUSCULAR; INTRAVENOUS AS NEEDED
Status: DISCONTINUED | OUTPATIENT
Start: 2024-09-05 | End: 2024-09-05

## 2024-09-05 RX ORDER — HYDROMORPHONE HYDROCHLORIDE 1 MG/ML
0.4 INJECTION, SOLUTION INTRAMUSCULAR; INTRAVENOUS; SUBCUTANEOUS EVERY 5 MIN PRN
Status: DISCONTINUED | OUTPATIENT
Start: 2024-09-05 | End: 2024-09-06 | Stop reason: HOSPADM

## 2024-09-05 RX ORDER — LIDOCAINE HYDROCHLORIDE 10 MG/ML
0.1 INJECTION, SOLUTION EPIDURAL; INFILTRATION; INTRACAUDAL; PERINEURAL ONCE
Status: DISCONTINUED | OUTPATIENT
Start: 2024-09-05 | End: 2024-09-06 | Stop reason: HOSPADM

## 2024-09-05 RX ORDER — ONDANSETRON HYDROCHLORIDE 2 MG/ML
4 INJECTION, SOLUTION INTRAVENOUS ONCE AS NEEDED
Status: DISCONTINUED | OUTPATIENT
Start: 2024-09-05 | End: 2024-09-06 | Stop reason: HOSPADM

## 2024-09-05 RX ORDER — MIDAZOLAM HYDROCHLORIDE 1 MG/ML
INJECTION INTRAMUSCULAR; INTRAVENOUS AS NEEDED
Status: DISCONTINUED | OUTPATIENT
Start: 2024-09-05 | End: 2024-09-05

## 2024-09-05 RX ORDER — ONDANSETRON HYDROCHLORIDE 2 MG/ML
INJECTION, SOLUTION INTRAVENOUS AS NEEDED
Status: DISCONTINUED | OUTPATIENT
Start: 2024-09-05 | End: 2024-09-05

## 2024-09-05 RX ORDER — PROPOFOL 10 MG/ML
INJECTION, EMULSION INTRAVENOUS AS NEEDED
Status: DISCONTINUED | OUTPATIENT
Start: 2024-09-05 | End: 2024-09-05

## 2024-09-05 RX ORDER — SODIUM CHLORIDE, SODIUM LACTATE, POTASSIUM CHLORIDE, CALCIUM CHLORIDE 600; 310; 30; 20 MG/100ML; MG/100ML; MG/100ML; MG/100ML
100 INJECTION, SOLUTION INTRAVENOUS CONTINUOUS
Status: DISCONTINUED | OUTPATIENT
Start: 2024-09-05 | End: 2024-09-06 | Stop reason: HOSPADM

## 2024-09-05 ASSESSMENT — PAIN SCALES - GENERAL
PAINLEVEL_OUTOF10: 5 - MODERATE PAIN
PAINLEVEL_OUTOF10: 6
PAIN_LEVEL: 4
PAINLEVEL_OUTOF10: 6

## 2024-09-05 ASSESSMENT — PAIN - FUNCTIONAL ASSESSMENT
PAIN_FUNCTIONAL_ASSESSMENT: 0-10

## 2024-09-05 ASSESSMENT — PAIN DESCRIPTION - LOCATION: LOCATION: ABDOMEN

## 2024-09-05 ASSESSMENT — COLUMBIA-SUICIDE SEVERITY RATING SCALE - C-SSRS
1. IN THE PAST MONTH, HAVE YOU WISHED YOU WERE DEAD OR WISHED YOU COULD GO TO SLEEP AND NOT WAKE UP?: NO
6. HAVE YOU EVER DONE ANYTHING, STARTED TO DO ANYTHING, OR PREPARED TO DO ANYTHING TO END YOUR LIFE?: NO
2. HAVE YOU ACTUALLY HAD ANY THOUGHTS OF KILLING YOURSELF?: NO

## 2024-09-05 NOTE — ANESTHESIA POSTPROCEDURE EVALUATION
Patient: Osiris Jessica    Procedure Summary       Date: 09/05/24 Room / Location: [facility]    Anesthesia Start: 1251 Anesthesia Stop: 1338    Procedure: EGD Diagnosis: Gastric dysmotility    Scheduled Providers: Alfie Lackey MD; Amauri Sam MD Responsible Provider: Amauri Sam MD    Anesthesia Type: MAC ASA Status: 2            Anesthesia Type: MAC    Vitals Value Taken Time   /59 09/05/24 1338   Temp 36.3 °C (97.3 °F) 09/05/24 1338   Pulse 82 09/05/24 1338   Resp 22 09/05/24 1338   SpO2 100 % 09/05/24 1338       Anesthesia Post Evaluation    Patient location during evaluation: PACU  Patient participation: complete - patient participated  Level of consciousness: awake  Pain score: 4  Pain management: adequate  Airway patency: patent  Cardiovascular status: acceptable  Respiratory status: acceptable  Hydration status: acceptable  Postoperative Nausea and Vomiting: none    There were no known notable events for this encounter.

## 2024-09-05 NOTE — ANESTHESIA PREPROCEDURE EVALUATION
Patient: Osiris Jessica    Procedure Information       Date/Time: 09/05/24 1300    Scheduled providers: Alfie Lackey MD; Amauri Sam MD    Procedure: EGD    Location: [facility]            Relevant Problems   Cardiac   (+) Murmur, cardiac      Pulmonary   (+) Asthma (HHS-HCC)      Neuro   (+) Headache, chronic daily      GI  Delayed gastric emptying   (+) Chronic gastroesophageal reflux disease      Hematology   (+) Anemia      Musculoskeletal  Eben danlos syndrome   (+) Kyphoscoliosis and scoliosis       Clinical information reviewed:   Tobacco  Allergies  Meds   Med Hx  Surg Hx  OB Status  Fam Hx  Soc   Hx        NPO Detail:  NPO/Void Status  Carbohydrate Drink Given Prior to Surgery? : N  Date of Last Liquid: 09/03/24  Time of Last Liquid: 1700  Date of Last Solid: 09/03/24  Time of Last Solid: 0800  Last Intake Type: Clear fluids  Time of Last Void: 1000         Physical Exam    Airway  Mallampati: II  TM distance: >3 FB     Cardiovascular   Rhythm: regular  Rate: normal     Dental - normal exam     Pulmonary - normal exam     Abdominal - normal exam         Anesthesia Plan    History of general anesthesia?: yes  History of complications of general anesthesia?: no    ASA 2     MAC     intravenous induction   Anesthetic plan and risks discussed with patient.    Plan discussed with CRNA and CAA.

## 2024-09-05 NOTE — H&P
History Of Present Illness  Osiris Jessica is a 23 y.o. female presenting with gastroparesis.     Past Medical History  Past Medical History:   Diagnosis Date    Anemia     Asthma (HHS-HCC)     Delayed emergence from general anesthesia     Eben-Danlos syndrome (HHS-HCC)     Esophageal dysmotility     Gastroparesis     GERD (gastroesophageal reflux disease)      Surgical History  Past Surgical History:   Procedure Laterality Date    COLONOSCOPY      ESOPHAGEAL MOTILITY STUDY      ESOPHAGOGASTRODUODENOSCOPY      IR TUNNELED CENTRAL PORT PLACEMENT      right subclavian    LUMBAR FUSION      T2-L4    WISDOM TOOTH EXTRACTION       Social History  She reports that she has never smoked. She has never used smokeless tobacco. She reports that she does not drink alcohol and does not use drugs.    Family History  No family history on file.     Allergies  Allergies   Allergen Reactions    [place] Anaphylaxis, Angioedema, GI Upset, Hives, Itching, Unknown, Rash, Shortness of breath and Wheezing    Red Dye GI Upset, Hives, Itching, Nausea/vomiting and Rash    Tree Nuts Anaphylaxis, Other, Hives, Itching, Nausea/vomiting, Unknown, Rash and Shortness of breath    Allegra [Fexofenadine] Unknown    Animal Dander Itching, Unknown and Runny nose     Runny nose and sneezing   Cat dander confirmed by skin testing 07/02/24    Runny nose and sneezing    Ciprofloxacin Unknown    Egg Unknown, Diarrhea, GI intolerance and GI Upset    Gluten Unknown, Diarrhea, GI intolerance, Other and Nausea/vomiting    Grass Pollen Hives, Itching, Unknown and Runny nose     Confirmed with skin testing 07/02/24    House Dust Mite Itching, Runny nose and Wheezing    Midodrine Unknown    Milk Containing Products (Dairy) Diarrhea, Other, Unknown and Nausea/vomiting    Nortriptyline Unknown    Tree And Shrub Pollen Dermatitis, Hives, Itching, Runny nose and Other     Confirmed with skin testing 07/02/24    Chlorhexidine Hives, Itching and Rash    Chlorine  "Itching, Unknown, Rash and Runny nose    Mold Hives, Itching, Rash and Unknown     Confirmed with skin testing 07/02/24    Soy Diarrhea, Other, Hives, Itching, Nausea/vomiting and Rash    Weed Pollen Hives, Itching, Rash, Runny nose and Other     Confirmed with skin testing 07/02/24     Review of Systems     Physical Exam     Last Recorded Vitals  Blood pressure 123/69, pulse 67, temperature 36.3 °C (97.4 °F), resp. rate 18, height 1.651 m (5' 5\"), weight 55.3 kg (122 lb), last menstrual period 05/05/2024, SpO2 100%.    Assessment/Plan   Gastroparesis     Proceed with EGD and PEJ     Alfie Lackey MD  "

## 2024-09-05 NOTE — SIGNIFICANT EVENT
Pt requested for her dad to wait in the lobby, he was advised of the same and that she is complete with procedure.

## 2024-09-05 NOTE — DISCHARGE INSTRUCTIONS

## 2024-09-06 ENCOUNTER — TELEPHONE (OUTPATIENT)
Dept: GASTROENTEROLOGY | Facility: HOSPITAL | Age: 24
End: 2024-09-06

## 2024-09-06 ENCOUNTER — TELEPHONE (OUTPATIENT)
Dept: GASTROENTEROLOGY | Facility: HOSPITAL | Age: 24
End: 2024-09-06
Payer: COMMERCIAL

## 2024-09-06 NOTE — TELEPHONE ENCOUNTER
Patient called noting some pain at PEG/J site after jejunal extension placed yesterday.  Tube is functioning okay and tolerating oral.  Has taken some tylenol without relief.  Would like to avoid narcotics especially in light of gastric dysmotility.  Recommend she try anti inflammatories and alternate with tylenol.  Suspect this will improve as skin heals around PEG Tube site.  She is in agreement with the plan and will call back if pain persists.

## 2024-09-09 DIAGNOSIS — E44.0 MODERATE PROTEIN-CALORIE MALNUTRITION (MULTI): Primary | ICD-10-CM

## 2024-09-17 ENCOUNTER — APPOINTMENT (OUTPATIENT)
Dept: INTEGRATIVE MEDICINE | Facility: CLINIC | Age: 24
End: 2024-09-17
Payer: COMMERCIAL

## 2024-09-17 DIAGNOSIS — K31.89 GASTRIC DYSMOTILITY: ICD-10-CM

## 2024-09-17 DIAGNOSIS — Z91.018 FOOD ALLERGY: ICD-10-CM

## 2024-09-17 DIAGNOSIS — R11.10 CHRONIC VOMITING: ICD-10-CM

## 2024-09-17 DIAGNOSIS — G93.32 CHRONIC FATIGUE SYNDROME: ICD-10-CM

## 2024-09-17 DIAGNOSIS — G90.1 DYSAUTONOMIA (MULTI): ICD-10-CM

## 2024-09-17 DIAGNOSIS — G90.A POTS (POSTURAL ORTHOSTATIC TACHYCARDIA SYNDROME): Primary | ICD-10-CM

## 2024-09-17 DIAGNOSIS — E86.1 HYPOVOLEMIA: ICD-10-CM

## 2024-09-17 DIAGNOSIS — K59.89 GENERALIZED INTESTINAL DYSMOTILITY: ICD-10-CM

## 2024-09-17 DIAGNOSIS — Q79.60 EHLERS-DANLOS SYNDROME (HHS-HCC): ICD-10-CM

## 2024-09-17 DIAGNOSIS — E73.9 LACTOSE INTOLERANCE: ICD-10-CM

## 2024-09-17 DIAGNOSIS — K90.41 GLUTEN INTOLERANCE: ICD-10-CM

## 2024-09-17 DIAGNOSIS — R11.0 CHRONIC NAUSEA: ICD-10-CM

## 2024-09-17 PROBLEM — L30.9 ECZEMA: Status: ACTIVE | Noted: 2024-09-17

## 2024-09-17 PROCEDURE — 99215 OFFICE O/P EST HI 40 MIN: CPT | Performed by: PEDIATRICS

## 2024-09-18 ENCOUNTER — OFFICE VISIT (OUTPATIENT)
Dept: CARDIOLOGY | Facility: HOSPITAL | Age: 24
End: 2024-09-18
Payer: COMMERCIAL

## 2024-09-18 VITALS
HEIGHT: 64 IN | BODY MASS INDEX: 19.63 KG/M2 | HEART RATE: 90 BPM | WEIGHT: 115 LBS | DIASTOLIC BLOOD PRESSURE: 75 MMHG | SYSTOLIC BLOOD PRESSURE: 120 MMHG | OXYGEN SATURATION: 99 %

## 2024-09-18 DIAGNOSIS — G90.A POTS (POSTURAL ORTHOSTATIC TACHYCARDIA SYNDROME): ICD-10-CM

## 2024-09-18 DIAGNOSIS — R01.1 SYSTOLIC EJECTION MURMUR: ICD-10-CM

## 2024-09-18 DIAGNOSIS — R01.1 MURMUR, CARDIAC: Primary | ICD-10-CM

## 2024-09-18 PROCEDURE — 93005 ELECTROCARDIOGRAM TRACING: CPT | Performed by: STUDENT IN AN ORGANIZED HEALTH CARE EDUCATION/TRAINING PROGRAM

## 2024-09-18 PROCEDURE — 99204 OFFICE O/P NEW MOD 45 MIN: CPT | Performed by: STUDENT IN AN ORGANIZED HEALTH CARE EDUCATION/TRAINING PROGRAM

## 2024-09-18 PROCEDURE — 99214 OFFICE O/P EST MOD 30 MIN: CPT | Performed by: STUDENT IN AN ORGANIZED HEALTH CARE EDUCATION/TRAINING PROGRAM

## 2024-09-18 PROCEDURE — 3008F BODY MASS INDEX DOCD: CPT | Performed by: STUDENT IN AN ORGANIZED HEALTH CARE EDUCATION/TRAINING PROGRAM

## 2024-09-18 ASSESSMENT — PAIN SCALES - GENERAL: PAINLEVEL: 5

## 2024-09-18 NOTE — PROGRESS NOTES
Location of visit: [facility]   Type of Visit: New    Chief Complaint:  Patient was referred to Cardiology by Dr. Atr for heart murmur    History Of Present Illness:    Osiris Jessica is a 23 y.o. female, with history significant for PATRICIA, allergic rhinitis, asthma, POTS, Eben Danlos, gastroparesis s/p PEG 9/5/2024, GERD who visits Cardiology today as a new patient  for evaluation of heart murmur.    Patient denies chest pain, dyspnea on exertion, shortness of breath, orthopnea, PND, nocturia, edema, palpitations, dizziness, lightheadedness, syncope, claudication, or snoring/apnea.    Enjoys competitive horse back riding, exercises regularly although hasn't recently due to PEG placement.    Blood pressure: 120/75 mmHg  HR: 90 bpm    Today's ECG shows sinus rhythm at 77 bpm, normal AV conduction, and normal ventricular repolarization.    Social: no tobacco, alcohol, illicit drugs    FH: multiple maternal uncles with hx MI in 40s-50s. No history of sudden cardiac death.    Past Medical History:  She has a past medical history of Anemia, Asthma (HHS-HCC), Delayed emergence from general anesthesia, Eben-Danlos syndrome (HHS-HCC), Esophageal dysmotility, Gastroparesis, and GERD (gastroesophageal reflux disease).    Past Surgical History:  She has a past surgical history that includes Esophagogastroduodenoscopy; Colonoscopy; Esophageal motility study; Lumbar fusion; IR tunneled central port placement; and Timber tooth extraction.    Social History:  She reports that she has never smoked. She has never used smokeless tobacco. She reports that she does not drink alcohol and does not use drugs.    Family History:  No family history on file.    Allergies:  [place]iloquin, Red dye, Tree nuts, Allegra [fexofenadine], Animal dander, Ciprofloxacin, Egg, Gluten, Grass pollen, House dust mite, Midodrine, Milk containing products (dairy), Nortriptyline, Tree and shrub pollen, Chlorhexidine, Chlorine, Mold, Soy, and Weed  "pollen    Outpatient Medications:  Current Outpatient Medications   Medication Instructions    acetaminophen (TYLENOL) 1,000 mg, oral, Every 6 hours PRN    albuterol 90 mcg/actuation inhaler 2 puffs, inhalation, Every 4 hours PRN    ascorbic acid (VITAMIN C) 2,000 mg, oral, Daily    budesonide-formoteroL (Symbicort) 80-4.5 mcg/actuation inhaler 2 puffs, inhalation, 2 times daily    cetirizine (ZYRTEC) 10 mg, oral, Daily RT    cholecalciferol (VITAMIN D3) 50 mcg, oral, Daily    clobetasol (Temovate) 0.05 % ointment Topical, 2 times daily    cyanocobalamin (VITAMIN B-12) 1,000 mcg, oral, Daily    diphenhydrAMINE (BENADRYL) 50 mg, oral, Nightly PRN    docusate sodium (COLACE) 100 mg, oral, 2 times daily PRN    EPINEPHrine (EPIPEN) 0.3 mg, intramuscular, Daily PRN    gera, Zingiber officinalis, 250 mg capsule 1 capsule, oral, Daily PRN    hydrocortisone acetate 1 % cream 1 Application, topical (top), Daily PRN    ibuprofen 800 mg, oral, Every 6 hours PRN    lidocaine-prilocaine (Emla) 2.5-2.5 % cream 1 Application, Topical, Once PRN Procedure    MULTIVITAMIN IV intravenous, Infuse every other day    mupirocin (Bactroban) 2 % ointment Topical, 3 times daily    ondansetron HCl (ZOFRAN IV) 4 mg, intravenous, Every 6 hours PRN, Infuse PRN     ondansetron ODT (ZOFRAN-ODT) 4 mg, oral, Every 8 hours PRN    polyethylene glycol (GLYCOLAX, MIRALAX) 17 g, oral, Daily PRN    quercetin 1,000 mg, oral, Daily RT    scopolamine (Transderm-Scop) 1 mg over 3 days patch 3 day 1 patch, transdermal, Every 72 hours PRN, Switch sides each time the patch is changed.     Last Recorded Vitals:  Vitals:    09/18/24 1145   BP: 120/75   Pulse: 90   SpO2: 99%   Weight: 52.2 kg (115 lb)   Height: 1.626 m (5' 4\")     Physical Exam:      9/18/2024    11:45 AM 9/5/2024     2:45 PM 9/5/2024     2:24 PM 9/5/2024     2:08 PM 9/5/2024     1:53 PM 9/5/2024     1:38 PM   Vitals   Systolic 120 116 113 111 119 110   Diastolic 75 69 66 62 70 59   Heart Rate 90 " "70 60 62 86 82   Temp      36.3 °C (97.3 °F)   Resp  19 16 19 20 22   Height (in) 1.626 m (5' 4\")        Weight (lb) 115        BMI 19.74 kg/m2        BSA (m2) 1.54 m2        Visit Report Report          Wt Readings from Last 5 Encounters:   09/18/24 52.2 kg (115 lb)   09/05/24 55.3 kg (122 lb)   09/04/24 55.6 kg (122 lb 9.2 oz)     General: Sitting up comfortably in chair; in no apparent distress.  HEENT: Normocephalic; atraumatic. Well hydrated.  Eyes: Anicteric sclera. Extraocular movement intact.  Neck: Supple; no thyromegaly; normal jugular venous pressure, no bruits.  Respiratory: Bilateral air entry equal. No wheezing.  Cardiovascular: Normal S1, S2; holosystolic murmur 2/6 auscultated at left parasternal border non radiated, no change change with position or raising legs.  Abdomen: Nondistended; nontender. (+) bowel sounds.  Extremities: No peripheral edema present. Pulses 2+ diffusely.  Neurological: Oriented to time, place, and person; nonfocal.  Psychiatric: Normal affect.     Last Labs Reviewed:  No results found.     Last Cardiology/Imaging Tests Personally Reviewed (if images available) and Interpreted:  ECG:  No results found.    Echocardiogram:  Transthoracic Echocardiogram; 8/2021([facility])  · Left ventricle is of normal size.   · Normal left ventricular diastolic function is present.   · The left ventricular systolic function is normal. The left ventricular ejection fraction is 70%-74% (normal LVEF is 55%-74%).   · There is normal left ventricular wall motion.   · The right ventricle has normal size, wall thickness and systolic function.   · There is mild (1+) tricuspid valve regurgitation present.   · Pulmonary pressure is normal.   · The aortic root dimension is small, though me be appropritate for gender and body habitus.   · There is increased velocity within the left ventricular outflow tract and across the aortic valve. The aortic valve is tricuspid with no evidence of stenosis. The " increased velocities may be as result of hyperdynamic circulation. The LVOT may be constitutionally small in dimension, however a fixed subvalvular narrowing cannot be excluded though was not clearly observed during this examination. There was no evidence of aortic regurgitation. Clinical correlation is recommended.     Cath:  No results found.    Stress Test:  No results found.    Cardiac CT/MRI:  No results found.    Other CT:  No results found.    CV RISK FACTORS:   # Hypertension: Last BP: 120/75.  # Hyperlipidemia: Last Tchol No results found for requested labs within last 365 days. / LDL No results found for requested labs within last 365 days. / HDL No results found for requested labs within last 365 days. / TRIG No results found for requested labs within last 365 days. (No results in last year.).  # Type II Diabetes Mellitus: Last A1c No results found for requested labs within last 365 days. (No results in last year.).  # Obesity: Last BMI: 19.73.  # CKD: Last BUN/Cr (GFR): No results found for requested labs within last 365 days./No results found for requested labs within last 365 days. (No results found for requested labs within last 365 days.), No results in last year..    ASCV RISK:  The ASCVD Risk score (Nohemi DK, et al., 2019) failed to calculate for the following reasons:    The 2019 ASCVD risk score is only valid for ages 40 to 79    Assessment:  23 y.o. female, with history significant for PATRICIA, allergic rhinitis, asthma, POTS, Eben Danlos, gastroparesis s/p PEG 9/5/2024, GERD who visits Cardiology today as a new patient for evaluation of heart murmur. There is LVOT flow acceleration described at OSH with unclear subaortic membrane vs. LVOT obstruction possible. No LVH, redundant MV or hyperdynamic LV described. Right chambers described as normal to suspect an ASD but cannot be ruled out. She reports doing better from POTS off medications (tried metoprolol and midodrine in the past), after PEG and  daily IV hydration.   Assessment & Plan  POTS (postural orthostatic tachycardia syndrome)  - No need of additional treatment at this time  - Ivabradine could be an option if symptoms recur with palpitations/tachycardia as beta blockers caused drop in blood pressure  Systolic ejection murmur  - Limited transthoracic echocardiogram for LVOT obstruction assessment and bubble study    I will contact the patient once the results are available through [facility]  I spent 60 minutes assessing the case between pre-charting, face-to-face patient interaction, and documentation    Xavier Zabala MD

## 2024-09-18 NOTE — ASSESSMENT & PLAN NOTE
- No need of additional treatment at this time  - Ivabradine could be an option if symptoms recur with palpitations/tachycardia as beta blockers caused drop in blood pressure

## 2024-09-18 NOTE — PATIENT INSTRUCTIONS
Dear Osiris Jessica,    It was a pleasure meeting you today at the Cardiology office. As we dicussed, your clinical condition is cardiac murmur. I recommended a limited echocardiogram with bubble study. I will contact you once your results are available to give you my impressions through [facility].    Sincerely,     Xavier Zabala MD

## 2024-09-18 NOTE — PROGRESS NOTES
"Location of visit: [facility]   Type of Visit: {Type of visit:42421::\"New\"}    Chief Complaint:  Patient was referred to Cardiology by Dr. Art for No chief complaint on file..    History Of Present Illness:    Osiris Jessica is a 23 y.o. female, with history significant for ***, who visits Cardiology today as a {TYPEOFVISIT:20932}  for ***.    Patient denies chest pain, dyspnea on exertion, shortness of breath, orthopnea, PND, nocturia, edema, palpitations, dizziness, lightheadedness, syncope, claudication, or snoring/apnea.    Blood pressure: ***/*** mmHg  HR: *** bpm    Today's ECG shows sinus rhythm at *** bpm, normal AV conduction, and normal ventricular repolarization.    Past Medical History:  She has a past medical history of Anemia, Asthma ([facility]HS-HCC), Delayed emergence from general anesthesia, Eben-Danlos syndrome (HHS-HCC), Esophageal dysmotility, Gastroparesis, and GERD (gastroesophageal reflux disease).    Past Surgical History:  She has a past surgical history that includes Esophagogastroduodenoscopy; Colonoscopy; Esophageal motility study; Lumbar fusion; IR tunneled central port placement; and Jerome tooth extraction.    Social History:  She reports that she has never smoked. She has never used smokeless tobacco. She reports that she does not drink alcohol and does not use drugs.    Family History:  No family history on file.  Allergies:  Ardara, Red dye, Tree nuts, Allegra [fexofenadine], Animal dander, Ciprofloxacin, Egg, Gluten, Grass pollen, House dust mite, Midodrine, Milk containing products (dairy), Nortriptyline, Tree and shrub pollen, Chlorhexidine, Chlorine, Mold, Soy, and Weed pollen    Outpatient Medications:  Current Outpatient Medications   Medication Instructions    acetaminophen (TYLENOL) 1,000 mg, oral, Every 6 hours PRN    albuterol 90 mcg/actuation inhaler 2 puffs, inhalation, Every 4 hours PRN    ascorbic acid (VITAMIN C) 2,000 mg, oral, Daily    budesonide-formoteroL " "(Symbicort) 80-4.5 mcg/actuation inhaler 2 puffs, inhalation, 2 times daily    cetirizine (ZYRTEC) 10 mg, oral, Daily RT    cholecalciferol (VITAMIN D3) 50 mcg, oral, Daily    clobetasol (Temovate) 0.05 % ointment Topical, 2 times daily    cyanocobalamin (VITAMIN B-12) 1,000 mcg, oral, Daily    diphenhydrAMINE (BENADRYL) 50 mg, oral, Nightly PRN    docusate sodium (COLACE) 100 mg, oral, 2 times daily PRN    EPINEPHrine (EPIPEN) 0.3 mg, intramuscular, Daily PRN    gera, Zingiber officinalis, 250 mg capsule 1 capsule, oral, Daily PRN    hydrocortisone acetate 1 % cream 1 Application, topical (top), Daily PRN    ibuprofen 800 mg, oral, Every 6 hours PRN    lidocaine-prilocaine (Emla) 2.5-2.5 % cream 1 Application, Topical, Once PRN Procedure    MULTIVITAMIN IV intravenous, Infuse every other day    mupirocin (Bactroban) 2 % ointment Topical, 3 times daily    ondansetron HCl (ZOFRAN IV) 4 mg, intravenous, Every 6 hours PRN, Infuse PRN     ondansetron ODT (ZOFRAN-ODT) 4 mg, oral, Every 8 hours PRN    polyethylene glycol (GLYCOLAX, MIRALAX) 17 g, oral, Daily PRN    quercetin 1,000 mg, oral, Daily RT    scopolamine (Transderm-Scop) 1 mg over 3 days patch 3 day 1 patch, transdermal, Every 72 hours PRN, Switch sides each time the patch is changed.     Last Recorded Vitals:  Vitals:    09/18/24 1145   BP: 120/75   Pulse: 90   SpO2: 99%   Weight: 52.2 kg (115 lb)   Height: 1.626 m (5' 4\")     Physical Exam:      9/18/2024    11:45 AM 9/5/2024     2:45 PM 9/5/2024     2:24 PM 9/5/2024     2:08 PM 9/5/2024     1:53 PM 9/5/2024     1:38 PM   Vitals   Systolic 120 116 113 111 119 110   Diastolic 75 69 66 62 70 59   Heart Rate 90 70 60 62 86 82   Temp      36.3 °C (97.3 °F)   Resp  19 16 19 20 22   Height (in) 1.626 m (5' 4\")        Weight (lb) 115        BMI 19.74 kg/m2        BSA (m2) 1.54 m2        Visit Report Report          Wt Readings from Last 5 Encounters:   09/18/24 52.2 kg (115 lb)   09/05/24 55.3 kg (122 lb) " "  09/04/24 55.6 kg (122 lb 9.2 oz)     General: Sitting up comfortably in chair; in no apparent distress.  HEENT: Normocephalic; atraumatic. Well hydrated.  Eyes: Anicteric sclera. Extraocular movement intact.  Neck: Supple; no thyromegaly; normal jugular venous pressure, no bruits.  Respiratory: Bilateral air entry equal. No wheezing.  Cardiovascular: Normal S1, S2; no murmurs auscultated.  Abdomen: Nondistended; nontender. (+) bowel sounds.  Extremities: No peripheral edema present. Pulses 2+ diffusely.  Neurological: Oriented to time, place, and person; nonfocal.  Psychiatric: Normal affect.     Last Labs Reviewed:  CBC -  No results for input(s): \"WBC\", \"HGB\", \"HCT\", \"PLT\", \"MCV\" in the last 93449 hours.  CMP -  No results for input(s): \"NA\", \"K\", \"CL\", \"CO2\", \"ANIONGAP\", \"BUN\", \"CREATININE\", \"EGFR\", \"MG\", \"CALCIUM\" in the last 55313 hours.No results for input(s): \"ALBUMIN\", \"ALKPHOS\", \"ALT\", \"AST\", \"BILITOT\", \"LIPASE\" in the last 02365 hours.    No lab exists for component: \"CA\"  LIPID PANEL -   No results for input(s): \"CHOL\", \"LDLF\", \"LDLCALC\", \"HDL\", \"TRIG\" in the last 43557 hours.  COAGULATION PANEL -  No results for input(s): \"PTT\", \"INR\", \"HAUF\", \"DDIMERVTE\", \"HAPTOGLOBIN\", \"FIBRINOGEN\" in the last 22268 hours.  HEME/ENDO -  No results for input(s): \"FERRITIN\", \"IRONSAT\", \"TSH\", \"HGBA1C\", \"FREET4\", \"CORTISOL\", \"ILGF1\" in the last 86275 hours.  CARDIOVASCULAR  No results for input(s): \"LDH\", \"CKMB\", \"TROPHS\", \"BNP\", \"CRP\" in the last 55644 hours.    No lab exists for component: \"CK\", \"CKMBP\", \"CRPUS\", \"USCRP\"  Last Cardiology/Imaging Tests Personally Reviewed (if images available) and Interpreted:  ECG:  No results found for this or any previous visit (from the past 4464 hour(s)).No results found for this or any previous visit from the past 1095 days.    Echocardiogram:  No results found for this or any previous visit from the past 1825 days.  No results found for this or any previous visit from the past " 1095 days.    Cath:  No results found for this or any previous visit from the past 1825 days.  No results found for this or any previous visit from the past 3650 days.  No results found for this or any previous visit from the past 1095 days.    Stress Test:  No results found for this or any previous visit from the past 1825 days.  No results found for this or any previous visit from the past 1095 days.    Cardiac CT/MRI:  No results found for this or any previous visit from the past 1825 days.  No results found for this or any previous visit from the past 1095 days.    Other CT:  *** No results found.    CV RISK FACTORS:   # Hypertension: Last BP: 120/75.  # Hyperlipidemia: Last Tchol No results found for requested labs within last 365 days. / LDL No results found for requested labs within last 365 days. / HDL No results found for requested labs within last 365 days. / TRIG No results found for requested labs within last 365 days. (No results in last year.).  # Type II Diabetes Mellitus: Last A1c No results found for requested labs within last 365 days. (No results in last year.).  # Obesity: Last BMI: 19.73.  # CKD: Last BUN/Cr (GFR): No results found for requested labs within last 365 days./No results found for requested labs within last 365 days. (No results found for requested labs within last 365 days.), No results in last year..    ASCV RISK:  The ASCVD Risk score (Nohemi DK, et al., 2019) failed to calculate for the following reasons:    The 2019 ASCVD risk score is only valid for ages 40 to 79    Assessment:  ***  Assessment & Plan  Murmur, cardiac    POTS (postural orthostatic tachycardia syndrome)    Systolic ejection murmur      {Follow up results:12945}  I spent *** minutes assessing the case between pre-charting, face-to-face patient interaction, and documentation    Xavier Zabala MD

## 2024-09-19 LAB
ATRIAL RATE: 77 BPM
P AXIS: 17 DEGREES
P OFFSET: 200 MS
P ONSET: 158 MS
PR INTERVAL: 122 MS
Q ONSET: 219 MS
QRS COUNT: 12 BEATS
QRS DURATION: 76 MS
QT INTERVAL: 378 MS
QTC CALCULATION(BAZETT): 427 MS
QTC FREDERICIA: 410 MS
R AXIS: 117 DEGREES
T AXIS: -20 DEGREES
T OFFSET: 408 MS
VENTRICULAR RATE: 77 BPM

## 2024-09-23 NOTE — PROGRESS NOTES
Subjective   Patient ID:     Connected with Osiris today to check-in on the status of the PEG-J supplies and products.  She is working with home health and we have clarified that independent care can be allowed and home nursing is not needed.  She was able to obtain a container of Flow[employer]ix peptide product and tolerated this.  She did not tolerate the nonpeptide product.  She plans to start with about 10 mL/hr on the first day and increase as tolerated.      We discussed some considerable difficulties with the tube placement and concerns surrounding the team's awareness of placing those lines.  She will be looking for follow-up assistance and may cast a wider net on provider options.    We also discussed and completed [place] paperwork today during the appointment time.  This was successfully submitted and approved.    Objective   Physical Exam mostly deferred due to telehealth.  Osiris was present throughout the visit and relayed information clearly and reliably.  Complexion was clear, fatigue was notable, however some optimism appeared present.    Assessment/Plan   Problem List Items Addressed This Visit             ICD-10-CM    Chronic fatigue syndrome G93.32    Chronic nausea R11.0    Gastric dysmotility K31.89     1.  Continue IV infusions with normal saline and D5 half-normal.    2.  Utilize IV Zofran to help with nausea and general feeding tolerance.    3.  Begin feeds with the [employer] 1.0 peptide.  We discussed starting at 10 mL/h and increasing as tolerated.  After 2 weeks, if possible, increase to 1.5 peptide.    4.  Explore with friends, colleagues and other resources a potential GI provider to follow.           Chronic vomiting R11.10    Dysautonomia (Multi) G90.1    Eben-Danlos syndrome ([facility]) Q79.60    Food allergy Z91.018    Generalized intestinal dysmotility K59.89    Hypovolemia E86.1    Lactose intolerance E73.9    Gluten intolerance K90.41    POTS (postural orthostatic tachycardia  syndrome) - Primary G90.A     We continue the IV fluids at 2 bags per day and noted that the preferable run speed was about 125 mL/h.  One of the bags will be normal saline and the other D5 half-normal.    If enteral feeds are tolerated, we may be able to decrease the IV fluid support.  We will track this over time.               Deepak Art MD, LAc 09/23/24 8:16 AM

## 2024-09-23 NOTE — ASSESSMENT & PLAN NOTE
We continue the IV fluids at 2 bags per day and noted that the preferable run speed was about 125 mL/h.  One of the bags will be normal saline and the other D5 half-normal.    If enteral feeds are tolerated, we may be able to decrease the IV fluid support.  We will track this over time.

## 2024-09-23 NOTE — ASSESSMENT & PLAN NOTE
1.  Continue IV infusions with normal saline and D5 half-normal.    2.  Utilize IV Zofran to help with nausea and general feeding tolerance.    3.  Begin feeds with the [employer] Farm 1.0 peptide.  We discussed starting at 10 mL/h and increasing as tolerated.  After 2 weeks, if possible, increase to 1.5 peptide.    4.  Explore with friends, colleagues and other resources a potential GI provider to follow.

## 2024-09-25 DIAGNOSIS — E87.8 REFEEDING SYNDROME: ICD-10-CM

## 2024-09-25 DIAGNOSIS — E46 MALNUTRITION, UNSPECIFIED TYPE (MULTI): Primary | ICD-10-CM

## 2024-09-25 NOTE — PROGRESS NOTES
Osiris was able to begin her feeds with the [employer] peptide formula and that is sitting better than the previous formulas.  She is experiencing some more negative overall sense of health since starting, however and we need to watch for refeeding syndrome.  She was asked to please hold her feeds at a current, low rate and we will check some electrolytes.

## 2024-09-27 ENCOUNTER — LAB (OUTPATIENT)
Dept: LAB | Facility: LAB | Age: 24
End: 2024-09-27
Payer: COMMERCIAL

## 2024-09-27 DIAGNOSIS — E87.8 REFEEDING SYNDROME: ICD-10-CM

## 2024-09-27 DIAGNOSIS — E46 MALNUTRITION, UNSPECIFIED TYPE (MULTI): ICD-10-CM

## 2024-09-27 LAB
25(OH)D3 SERPL-MCNC: 36 NG/ML (ref 30–100)
ALBUMIN SERPL BCP-MCNC: 4.9 G/DL (ref 3.4–5)
ALP SERPL-CCNC: 54 U/L (ref 33–110)
ALT SERPL W P-5'-P-CCNC: 19 U/L (ref 7–45)
ANION GAP SERPL CALC-SCNC: 15 MMOL/L (ref 10–20)
AST SERPL W P-5'-P-CCNC: 18 U/L (ref 9–39)
BASOPHILS # BLD AUTO: 0.03 X10*3/UL (ref 0–0.1)
BASOPHILS NFR BLD AUTO: 0.6 %
BILIRUB SERPL-MCNC: 0.7 MG/DL (ref 0–1.2)
BUN SERPL-MCNC: 22 MG/DL (ref 6–23)
CALCIUM SERPL-MCNC: 9.8 MG/DL (ref 8.6–10.6)
CHLORIDE SERPL-SCNC: 103 MMOL/L (ref 98–107)
CO2 SERPL-SCNC: 26 MMOL/L (ref 21–32)
CREAT SERPL-MCNC: 0.67 MG/DL (ref 0.5–1.05)
EGFRCR SERPLBLD CKD-EPI 2021: >90 ML/MIN/1.73M*2
EOSINOPHIL # BLD AUTO: 0.03 X10*3/UL (ref 0–0.7)
EOSINOPHIL NFR BLD AUTO: 0.6 %
ERYTHROCYTE [DISTWIDTH] IN BLOOD BY AUTOMATED COUNT: 11.9 % (ref 11.5–14.5)
FERRITIN SERPL-MCNC: 73 NG/ML (ref 8–150)
GLUCOSE SERPL-MCNC: 74 MG/DL (ref 74–99)
HCT VFR BLD AUTO: 37.5 % (ref 36–46)
HGB BLD-MCNC: 12 G/DL (ref 12–16)
IMM GRANULOCYTES # BLD AUTO: 0.01 X10*3/UL (ref 0–0.7)
IMM GRANULOCYTES NFR BLD AUTO: 0.2 % (ref 0–0.9)
IRON SATN MFR SERPL: 22 % (ref 25–45)
IRON SERPL-MCNC: 85 UG/DL (ref 35–150)
LYMPHOCYTES # BLD AUTO: 1.41 X10*3/UL (ref 1.2–4.8)
LYMPHOCYTES NFR BLD AUTO: 29.8 %
MCH RBC QN AUTO: 30.4 PG (ref 26–34)
MCHC RBC AUTO-ENTMCNC: 32 G/DL (ref 32–36)
MCV RBC AUTO: 95 FL (ref 80–100)
MONOCYTES # BLD AUTO: 0.27 X10*3/UL (ref 0.1–1)
MONOCYTES NFR BLD AUTO: 5.7 %
NEUTROPHILS # BLD AUTO: 2.98 X10*3/UL (ref 1.2–7.7)
NEUTROPHILS NFR BLD AUTO: 63.1 %
NRBC BLD-RTO: 0 /100 WBCS (ref 0–0)
PHOSPHATE SERPL-MCNC: 4 MG/DL (ref 2.5–4.9)
PLATELET # BLD AUTO: 339 X10*3/UL (ref 150–450)
POTASSIUM SERPL-SCNC: 4.3 MMOL/L (ref 3.5–5.3)
PROT SERPL-MCNC: 7.9 G/DL (ref 6.4–8.2)
RBC # BLD AUTO: 3.95 X10*6/UL (ref 4–5.2)
SODIUM SERPL-SCNC: 140 MMOL/L (ref 136–145)
TIBC SERPL-MCNC: 391 UG/DL (ref 240–445)
UIBC SERPL-MCNC: 306 UG/DL (ref 110–370)
WBC # BLD AUTO: 4.7 X10*3/UL (ref 4.4–11.3)

## 2024-09-27 PROCEDURE — 84100 ASSAY OF PHOSPHORUS: CPT

## 2024-09-27 PROCEDURE — 80053 COMPREHEN METABOLIC PANEL: CPT

## 2024-09-27 PROCEDURE — 82728 ASSAY OF FERRITIN: CPT

## 2024-09-27 PROCEDURE — 85025 COMPLETE CBC W/AUTO DIFF WBC: CPT

## 2024-09-27 PROCEDURE — 82306 VITAMIN D 25 HYDROXY: CPT

## 2024-09-27 PROCEDURE — 83540 ASSAY OF IRON: CPT

## 2024-09-27 PROCEDURE — 84425 ASSAY OF VITAMIN B-1: CPT

## 2024-09-27 PROCEDURE — 83550 IRON BINDING TEST: CPT

## 2024-10-02 LAB
ATRIAL RATE: 77 BPM
P AXIS: 17 DEGREES
P OFFSET: 200 MS
P ONSET: 158 MS
PR INTERVAL: 122 MS
Q ONSET: 219 MS
QRS COUNT: 12 BEATS
QRS DURATION: 76 MS
QT INTERVAL: 378 MS
QTC CALCULATION(BAZETT): 427 MS
QTC FREDERICIA: 410 MS
R AXIS: 117 DEGREES
T AXIS: -20 DEGREES
T OFFSET: 408 MS
VENTRICULAR RATE: 77 BPM
VIT B1 PYROPHOSHATE BLD-SCNC: 139 NMOL/L (ref 70–180)

## 2024-10-10 ENCOUNTER — APPOINTMENT (OUTPATIENT)
Dept: CARDIOLOGY | Facility: HOSPITAL | Age: 24
End: 2024-10-10
Payer: COMMERCIAL

## 2024-10-11 ENCOUNTER — APPOINTMENT (OUTPATIENT)
Dept: CARDIOLOGY | Facility: HOSPITAL | Age: 24
End: 2024-10-11
Payer: COMMERCIAL

## 2024-10-25 ENCOUNTER — HOSPITAL ENCOUNTER (OUTPATIENT)
Dept: CARDIOLOGY | Facility: CLINIC | Age: 24
Discharge: HOME | End: 2024-10-25
Payer: COMMERCIAL

## 2024-10-25 ENCOUNTER — APPOINTMENT (OUTPATIENT)
Dept: INTEGRATIVE MEDICINE | Facility: HOSPITAL | Age: 24
End: 2024-10-25
Payer: COMMERCIAL

## 2024-10-25 DIAGNOSIS — G90.A POTS (POSTURAL ORTHOSTATIC TACHYCARDIA SYNDROME): ICD-10-CM

## 2024-10-25 DIAGNOSIS — R01.1 SYSTOLIC EJECTION MURMUR: ICD-10-CM

## 2024-10-25 PROCEDURE — 93321 DOPPLER ECHO F-UP/LMTD STD: CPT

## 2024-10-28 LAB
AORTIC VALVE MEAN GRADIENT: 4.8 MMHG
AORTIC VALVE PEAK VELOCITY: 1.42 M/S
AV PEAK GRADIENT: 8.1 MMHG
AVA (PEAK VEL): 1.88 CM2
AVA (VTI): 1.96 CM2
EJECTION FRACTION APICAL 4 CHAMBER: 63.7
EJECTION FRACTION: 63 %
LEFT ATRIUM VOLUME AREA LENGTH INDEX BSA: 28.7 ML/M2
LEFT VENTRICLE INTERNAL DIMENSION DIASTOLE: 4.5 CM (ref 3.5–6)
LEFT VENTRICULAR OUTFLOW TRACT DIAMETER: 1.64 CM
MITRAL VALVE E/A RATIO: 1.82
RIGHT VENTRICLE FREE WALL PEAK S': 12 CM/S
RIGHT VENTRICLE PEAK SYSTOLIC PRESSURE: 24.2 MMHG
TRICUSPID ANNULAR PLANE SYSTOLIC EXCURSION: 2.7 CM

## 2024-11-01 ENCOUNTER — APPOINTMENT (OUTPATIENT)
Dept: INTEGRATIVE MEDICINE | Facility: HOSPITAL | Age: 24
End: 2024-11-01
Payer: COMMERCIAL

## 2024-11-08 ENCOUNTER — APPOINTMENT (OUTPATIENT)
Dept: INTEGRATIVE MEDICINE | Facility: HOSPITAL | Age: 24
End: 2024-11-08
Payer: COMMERCIAL

## 2024-11-22 ENCOUNTER — APPOINTMENT (OUTPATIENT)
Dept: INTEGRATIVE MEDICINE | Facility: HOSPITAL | Age: 24
End: 2024-11-22
Payer: COMMERCIAL

## 2024-12-05 ENCOUNTER — APPOINTMENT (OUTPATIENT)
Dept: INTEGRATIVE MEDICINE | Facility: CLINIC | Age: 24
End: 2024-12-05
Payer: COMMERCIAL

## 2024-12-05 DIAGNOSIS — R51.9 HEADACHE, CHRONIC DAILY: ICD-10-CM

## 2024-12-05 DIAGNOSIS — R41.89 BRAIN FOG: ICD-10-CM

## 2024-12-05 DIAGNOSIS — R11.0 CHRONIC NAUSEA: ICD-10-CM

## 2024-12-05 DIAGNOSIS — D89.40 MAST CELL ACTIVATION SYNDROME: ICD-10-CM

## 2024-12-05 DIAGNOSIS — G90.1 DYSAUTONOMIA (MULTI): ICD-10-CM

## 2024-12-05 DIAGNOSIS — G93.32 CHRONIC FATIGUE SYNDROME: ICD-10-CM

## 2024-12-05 DIAGNOSIS — K59.89 GENERALIZED INTESTINAL DYSMOTILITY: ICD-10-CM

## 2024-12-05 DIAGNOSIS — Z91.018 FOOD ALLERGY: Primary | ICD-10-CM

## 2024-12-05 DIAGNOSIS — E86.1 HYPOVOLEMIA: ICD-10-CM

## 2024-12-05 DIAGNOSIS — G90.A POTS (POSTURAL ORTHOSTATIC TACHYCARDIA SYNDROME): ICD-10-CM

## 2024-12-05 DIAGNOSIS — K31.89 GASTRIC DYSMOTILITY: ICD-10-CM

## 2024-12-05 PROCEDURE — 99214 OFFICE O/P EST MOD 30 MIN: CPT | Performed by: PEDIATRICS

## 2024-12-05 RX ORDER — SCOLOPAMINE TRANSDERMAL SYSTEM 1 MG/1
1 PATCH, EXTENDED RELEASE TRANSDERMAL
Qty: 10 PATCH | Refills: 2 | Status: SHIPPED | OUTPATIENT
Start: 2024-12-05 | End: 2025-03-05

## 2024-12-05 RX ORDER — NALTREXONE HYDROCHLORIDE 50 MG/1
50 TABLET, FILM COATED ORAL DAILY
Qty: 6 TABLET | Refills: 1 | Status: SHIPPED | OUTPATIENT
Start: 2024-12-05 | End: 2024-12-17

## 2024-12-05 RX ORDER — PANTOPRAZOLE SODIUM 40 MG/1
40 TABLET, DELAYED RELEASE ORAL
COMMUNITY
Start: 2024-11-25 | End: 2025-02-23

## 2024-12-05 NOTE — ASSESSMENT & PLAN NOTE
Consider stimulant medications for this (Ritalin/Adderall).    Look into https://www.webmd.com/vitamins/ai/ingredientmono-1596/palmitoylethanolamide-pea    https://bpspubs.onlinelibrary.peralta.com/doi/10.1111/bph.92422    https://pmc.ncbi.nlm.nih.gov/articles/NCK7451630/

## 2024-12-05 NOTE — PROGRESS NOTES
Subjective   Patient ID:   Met with Osiris today for a touch base on status posthospitalization.  She had gone to get the GJ switched to separate tubes but had a difficult time with recovery, including both anesthesia recovery and recovery in general following the stress of the surgery.  She had persistent nausea and low motility, and did end up getting put on TPN.  Her energy is better since then.  She is still running a trickle feed to try to maintain activity of the GI tract and we talked through a few possibilities that may help to awaken activity.    Brain fog is also an issue that was of concern.  We talked about the stimulant medications for this domain.  She is interested in starting low-dose naltrexone as well and so we will get that ordered and use a liquid form made from 50 mg tablets.  This may also help with brain fog.  We discussed the strategy for utilizing the low-dose naltrexone and what to expect.    We discussed potentially some herbal strategies for the dysmotility, azithromycin as well, and potentially trying fludrocortisone for nausea and abdominal pain.  She is taking quercetin at this time as well as a number of multivitamins.  She estimates the quercetin dose to be 1000 mg/day.  We touched on creatine as well for muscle pain and muscular endurance and palmitoyl ethanolamide, aka PEA, as another supplement that may help with inflammation and brain fog.    We reviewed [place] paperwork as well and that was updated and sent.    She does continue on her IV fluids, but is decreased the flow of these given the TPN availability.    Fatigue continues to be profound following the hospital stay and all of the interventions.  She is sleeping up to 20 hours/day, has joint pain, and some pain at the surgical site.  Dizziness is mild, tachycardia is mild.  There is still a sense of being in a flare overall.    Objective   Physical Exam mostly deferred due to telehealth.  Osiris was present throughout the  "visit and gave good history.    Assessment/Plan   Problem List Items Addressed This Visit             ICD-10-CM    Chronic fatigue syndrome G93.32     We will begin low-dose naltrexone using a 50 mg pill.  The pill must be dissolved in water for use.      Low Dose Naltrexone - for this, we will use a 50 mg Naltrexone pill dissolved in 50 ml of water.   This will make 1mg/ml.  Start with 1/2 ml=0.5mg, and increase as noted below.    LDN Dosing Instructions:   Start with 1/2 ml by JTube at night before bed.  If there are no negative side-effects after 5 days, increase to 1 ml.  If again no negative side effects for 5 days, increase to 1.5ml, and so on, up to about 4.5ml.    What we're looking for, in addition to no problems, is improvements.  If slight improvements are noticed at one dose, but \"could be better\", keep going up.  At some point, patients, for whom this is effective, find a dose where they max out on improvements.  For example, you're at 3.0ml and doing well, so you go to 3.5ml and it's about the same.  In that case, we go back down to 3.0ml and that would be the dose we'd stay at for a while (total duration TBD).    If at any point in the process there are negative side effects on a dose (nausea, loose stool, anxiety, etc.), stay on that same dose until those go away.  Usually, those clear up in about 1-2 weeks.    Here is one dropper set, and another below:    https://www.Abacast/dp/F7J6OT3FKL?ref=ppx_yo2ov_dt_b_fed_asin_title    Here's another option (does not have to be this exact one, but could be.)  https://www.Klood/50-ml-clear-glass-euro-bottle-w-black-child-resistant-tamper-evident-glass-dropper/?setCurrencyId=1&pxx=&gad_source=1&gclid=[id number]_wcB    Here's the dropper we want (or similar):  " https://www.TUTORize.[place]/1-oz-black-child-resistant-calibrated-glass-dropper--ghge-2-oz-bottles/?setCurrencyId=1&dfz=&gad_source=1&gclid=[id number]fUSHDQ8QCQgngmZinEaAu98EALw_wcB             Chronic nausea R11.0    Relevant Medications    scopolamine (Transderm-Scop) 1 mg over 3 days patch 3 day    Gastric dysmotility K31.89    Relevant Medications    scopolamine (Transderm-Scop) 1 mg over 3 days patch 3 day    Dysautonomia (Multi) G90.1    Food allergy - Primary Z91.018    Generalized intestinal dysmotility K59.89     1.  Consider the herbal product erika almaraz wan to help with gastric and intestinal activity.    2.  Consider azithromycin or erythromycin also to stimulate motility.    3.  Consider fludrocortisone for nausea and abdominal.  This may also help with dizziness and tachycardia.         Headache, chronic daily R51.9     Consider adding creatine for this and general muscle pain.         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    Look into https://www.webmd.com/vitamins/ai/ingredientmono-1596/palmitoylethanolamide-pea    https://bpspubs.onlinelibrary.peralta.com/doi/10.1111/bph.04966    https://pmc.ncbi.nlm.nih.gov/articles/REM0744215/                   Deepak Art MD, LAc 12/05/24 1:40 PM

## 2024-12-05 NOTE — ASSESSMENT & PLAN NOTE
1.  Consider the herbal product erika chinchilla to help with gastric and intestinal activity.    2.  Consider azithromycin or erythromycin also to stimulate motility.    3.  Consider fludrocortisone for nausea and abdominal.  This may also help with dizziness and tachycardia.

## 2024-12-05 NOTE — ASSESSMENT & PLAN NOTE
"We will begin low-dose naltrexone using a 50 mg pill.  The pill must be dissolved in water for use.      Low Dose Naltrexone - for this, we will use a 50 mg Naltrexone pill dissolved in 50 ml of water.   This will make 1mg/ml.  Start with 1/2 ml=0.5mg, and increase as noted below.    LDN Dosing Instructions:   Start with 1/2 ml by JTube at night before bed.  If there are no negative side-effects after 5 days, increase to 1 ml.  If again no negative side effects for 5 days, increase to 1.5ml, and so on, up to about 4.5ml.    What we're looking for, in addition to no problems, is improvements.  If slight improvements are noticed at one dose, but \"could be better\", keep going up.  At some point, patients, for whom this is effective, find a dose where they max out on improvements.  For example, you're at 3.0ml and doing well, so you go to 3.5ml and it's about the same.  In that case, we go back down to 3.0ml and that would be the dose we'd stay at for a while (total duration TBD).    If at any point in the process there are negative side effects on a dose (nausea, loose stool, anxiety, etc.), stay on that same dose until those go away.  Usually, those clear up in about 1-2 weeks.    Here is one dropper set, and another below:    https://www.Clink/dp/M4H2BB1OMX?ref=ppx_yo2ov_dt_b_fed_asin_title    Here's another option (does not have to be this exact one, but could be.)  https://www.Promodity/50-ml-clear-glass-euro-bottle-w-black-child-resistant-tamper-evident-glass-dropper/?setCurrencyId=1&ldf=&gad_source=1&gclid=[id number]E52Gd13qyXcAaAhEVEALw_wcB    Here's the dropper we want (or similar):  https://www.Promodity/1-oz-black-child-resistant-calibrated-glass-dropper--onwl-1-oz-bottles/?setCurrencyId=1&zuo=&gad_source=1&gclid=[id number]fUSHDQ8QCQgngmZinEaAu98EALw_wcB      "

## 2025-01-10 ENCOUNTER — APPOINTMENT (OUTPATIENT)
Dept: INTEGRATIVE MEDICINE | Facility: HOSPITAL | Age: 25
End: 2025-01-10
Payer: COMMERCIAL

## 2025-01-31 ENCOUNTER — APPOINTMENT (OUTPATIENT)
Dept: INTEGRATIVE MEDICINE | Facility: HOSPITAL | Age: 25
End: 2025-01-31
Payer: COMMERCIAL

## 2025-02-07 ENCOUNTER — APPOINTMENT (OUTPATIENT)
Dept: INTEGRATIVE MEDICINE | Facility: HOSPITAL | Age: 25
End: 2025-02-07
Payer: COMMERCIAL

## 2025-02-28 ENCOUNTER — APPOINTMENT (OUTPATIENT)
Dept: INTEGRATIVE MEDICINE | Facility: HOSPITAL | Age: 25
End: 2025-02-28
Payer: COMMERCIAL

## 2025-04-04 ENCOUNTER — ALLIED HEALTH (OUTPATIENT)
Dept: INTEGRATIVE MEDICINE | Facility: HOSPITAL | Age: 25
End: 2025-04-04
Payer: COMMERCIAL

## 2025-04-04 DIAGNOSIS — Z91.018 FOOD ALLERGY: ICD-10-CM

## 2025-04-04 DIAGNOSIS — L50.8 CHRONIC URTICARIA: ICD-10-CM

## 2025-04-04 DIAGNOSIS — E63.9 NUTRITION IMPAIRED DUE TO IMBALANCE OF NUTRIENTS: ICD-10-CM

## 2025-04-04 DIAGNOSIS — R53.82 CHRONIC FATIGUE: ICD-10-CM

## 2025-04-04 DIAGNOSIS — R41.89 BRAIN FOG: ICD-10-CM

## 2025-04-04 DIAGNOSIS — Q79.60 EHLERS-DANLOS SYNDROME (HHS-HCC): ICD-10-CM

## 2025-04-04 DIAGNOSIS — R89.9 ABNORMAL LABORATORY TEST: ICD-10-CM

## 2025-04-04 DIAGNOSIS — K31.89 GASTRIC DYSMOTILITY: ICD-10-CM

## 2025-04-04 DIAGNOSIS — Q99.8 OTHER SPECIFIED CHROMOSOME ABNORMALITIES: Primary | ICD-10-CM

## 2025-04-04 DIAGNOSIS — E55.9 VITAMIN D DEFICIENCY: ICD-10-CM

## 2025-04-04 DIAGNOSIS — L30.9 DERMATITIS: ICD-10-CM

## 2025-04-04 DIAGNOSIS — G90.A POTS (POSTURAL ORTHOSTATIC TACHYCARDIA SYNDROME): ICD-10-CM

## 2025-04-04 DIAGNOSIS — D89.40 MAST CELL ACTIVATION SYNDROME: ICD-10-CM

## 2025-04-04 DIAGNOSIS — R11.0 CHRONIC NAUSEA: ICD-10-CM

## 2025-04-04 DIAGNOSIS — E86.1 HYPOVOLEMIA: ICD-10-CM

## 2025-04-04 DIAGNOSIS — G93.32 CHRONIC FATIGUE SYNDROME: ICD-10-CM

## 2025-04-04 PROBLEM — E44.0 MALNUTRITION OF MODERATE DEGREE (MULTI): Status: ACTIVE | Noted: 2024-11-11

## 2025-04-04 PROBLEM — K92.89 GASTROINTESTINAL DYSMOTILITY: Status: ACTIVE | Noted: 2024-11-17

## 2025-04-04 PROBLEM — G43.719 INTRACTABLE CHRONIC MIGRAINE WITHOUT AURA AND WITHOUT STATUS MIGRAINOSUS: Status: ACTIVE | Noted: 2025-01-15

## 2025-04-04 PROBLEM — K31.84 GASTROPARESIS: Status: ACTIVE | Noted: 2024-11-11

## 2025-04-04 PROBLEM — R63.30 FEEDING DIFFICULTIES: Status: ACTIVE | Noted: 2024-11-14

## 2025-04-04 PROBLEM — R21 RASH: Status: ACTIVE | Noted: 2025-04-04

## 2025-04-04 PROCEDURE — 99215 OFFICE O/P EST HI 40 MIN: CPT | Performed by: PEDIATRICS

## 2025-04-04 PROCEDURE — 99215 OFFICE O/P EST HI 40 MIN: CPT | Mod: 95 | Performed by: PEDIATRICS

## 2025-04-04 RX ORDER — ONDANSETRON 4 MG/1
4 TABLET, ORALLY DISINTEGRATING ORAL EVERY 8 HOURS PRN
Qty: 45 TABLET | Refills: 1 | Status: SHIPPED | OUTPATIENT
Start: 2025-04-04 | End: 2025-05-04

## 2025-04-04 RX ORDER — SCOPOLAMINE 1 MG/3D
1 PATCH, EXTENDED RELEASE TRANSDERMAL
Qty: 10 PATCH | Refills: 2 | Status: SHIPPED | OUTPATIENT
Start: 2025-04-04 | End: 2025-07-03

## 2025-04-04 NOTE — ASSESSMENT & PLAN NOTE
Lets please restart the low-dose naltrexone.  You can start at a much lower dose to see if this eases side effects.  If side effects are the same at low and higher doses, I would encourage starting at a 2 mg dose, powering through and seeing if we can get some results from it faster.    Please also study and do your best to practice pacing.  Consider getting the visible gino and device to track activity.

## 2025-04-04 NOTE — ASSESSMENT & PLAN NOTE
Continue the IV fluids for supplemental volume expansion.    Please consider a trial of metoprolol to see if this helps with the tachycardia as we are not supporting the POTS beyond likely not quite enough fluids.

## 2025-04-04 NOTE — ASSESSMENT & PLAN NOTE
It is not clear to me that this is a simple eczematous outbreak.  It should have responded to the steroid creams if so.  We will check some nutritional parameters to assure there is not a core gap that we can find.

## 2025-04-04 NOTE — ASSESSMENT & PLAN NOTE
Continue as best as possible doing the feeds with the Nany Mau through the J-tube.    Consider trying the nicotine patch to see if this improves motility.    Utilize Zofran and scopolamine for nausea, but cautious with the frequency of Zofran use.

## 2025-04-04 NOTE — PROGRESS NOTES
Subjective   Patient ID:     Met with Osiris today for a check-in with a prior visit of December 5, 2024.  She has had quite an extensive time of it since then with some hospitalizations and changes to feeds.  She has weaned off TPN as of now but is not doing great off of it.  She is running feeds fairly continuously through the J-tube, using the [employer] product.  She has not had lab work done since November of last year and so we discussed doing this to assess nutritional status.  She has quite a significant rash on her hands as noted in the images below.  This has been evaluated by dermatology and she has tried a number of creams and ointments which have not impacted it.  It was previously on other parts of the body as well.  Due to the compromised nutrition, we will check for some factors that may be involved.    Fatigue has been quite bad.  She only tried LDN briefly because it increased nausea somewhat even at 1/2 pill.  She was doing the liquid version with the 50 mg pill dissolved in 50 mL of water.  We discussed restarting this potentially doing an at even a lower dose at 1/8-1/4 of a milligram to start.  The other option would be to jump to a higher dose tolerate side effects which may be the same as at lower dose, and accelerate the usage.  Given the response of any side effect, there is some good chance that it may be of help if we can get at a therapeutic level.  Because side effects disappear usually within about 2 weeks, if we can support through that we may begin to see improvements.    Pacing was also discussed and we did discuss the visible gino.  FMLA has run out and so work has been very demanding and difficult to maintain.  It also is likely leading to symptom flares.    Nausea is being controlled mostly with Zofran and scopolamine at this time.  We discussed trying to limit the Zofran usage is much as possible so is not to develop negative side effects from that.  She is doing IV fluids at a  1 L volume 4-6 times per week which still feels helpful and these are being prescribed by the primary care doctor.    We discussed allergic components to care and adding in a quercetin supplement.  We also discussed the nicotine patch and the potential for that to be useful as well.  This may particularly help the gastrointestinal symptoms.  It may support energy as well.     We are also not at all addressing any symptoms related to POTS.  Prior to our beginning work together, she had tried fludrocortisone and midodrine but had side effects from these and neither helped her symptoms.  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  We discussed the idea of potentially trialing metoprolol to see if this gives relief.        Objective   Physical Exam mostly deferred due to telehealth.  Osiris was present throughout the visit and contributed well to collaborative treatment planning.  She appeared tired and reports recent vomiting and nausea.                Assessment/Plan   Problem List Items Addressed This Visit             ICD-10-CM    Chronic fatigue syndrome G93.32     Lets please restart the low-dose naltrexone.  You can start at a much lower dose to see if this eases side effects.  If side effects are the same at low and higher doses, I would encourage starting at a 2 mg dose, powering through and seeing if we can get some results from it faster.    Please also study and do your best to practice pacing.  Consider getting the visible gino and device to track activity.         Chronic urticaria L50.8     Please add in a quercetin supplement at 500 mg/day.  [employer] makes a good powder that is highly potent sized and would be easier to give through the J-tube.  The dose of that product would be 1/4 teaspoon/day in a single or divided  dose.    https://www.bulksupplements.com/products/quercetin-dihydrate-powder?variant=[id number]&utm_source=google&utm_medium=organic&utm_campaign=[place]%20[place]%20English%20Multifeeds&utm_content=Quercetin%20Dihydrate%20Powder&utm_campaign=[id number]&utm_source=[employer]&utm_medium=cpc&utm_content=&utm_term=&ad_id=[id number]&wickedsource=google&wickedid=Cj0KCQjwhr6_BhD4ARIsAH1YdjCDgxiGHl1paljvQOuMW_wciE1MvjAYJA1nvsDvwNNVOfrO9GiZds4aAjeREALw_wcB&afypsfzs=[id number]&dfjv=[id number]&wv=4&gad_source=1&gclid=[id number]_BhD4ARIsAH1YdjCDgxiGHl1paljvQOuMW_wciE1MvjAYJA1nvsDvwNNVOfrO9GiZds4aAjeREALw_wcB         Chronic nausea R11.0    Relevant Medications    ondansetron ODT (Zofran-ODT) 4 mg disintegrating tablet    scopolamine (Transderm-Scop) 1 mg over 3 days patch 3 day    Gastric dysmotility K31.89     Continue as best as possible doing the feeds with the Nany León through the J-tube.    Consider trying the nicotine patch to see if this improves motility.    Utilize Zofran and scopolamine for nausea, but cautious with the frequency of Zofran use.         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         POTS (postural orthostatic tachycardia syndrome) G90.A     Continue the IV fluids for supplemental volume expansion.    Please consider a trial of metoprolol to see if this helps with the tachycardia as we are not supporting the POTS beyond likely not quite enough fluids.         Brain fog R41.89     Other Visit Diagnoses         Codes    Other specified chromosome abnormalities    -  Primary Q99.8    Abnormal laboratory test     R89.9    Nutrition impaired due to imbalance of nutrients     E63.9     Relevant Orders    Ferritin    Vitamin D 25-Hydroxy,Total (for eval of Vitamin D levels)    TSH with reflex to Free T4 if abnormal    Comprehensive Metabolic Panel    CBC and Auto Differential    Iron and TIBC    TINO with Reflex to KADEEM    Vitamin A    Vitamin B12    Vitamin B6    Vitamin C    Vitamin E    Vitamin K    Zinc, Serum or Plasma    Selenium, Blood    Vitamin B3 (Niacin and Metabolites)    Prealbumin    Vitamin D deficiency     E55.9    Relevant Orders    Vitamin D 25-Hydroxy,Total (for eval of Vitamin D levels)    Chronic fatigue     R53.82    Relevant Orders    Ferritin    TSH with reflex to Free T4 if abnormal    Comprehensive Metabolic Panel    CBC and Auto Differential    Iron and TIBC    TINO with Reflex to KADEEM    Vitamin A    Vitamin B12    Vitamin B6    Vitamin C    Vitamin E    Vitamin K    Zinc, Serum or Plasma    Selenium, Blood    Vitamin B3 (Niacin and Metabolites)    Dermatitis     L30.9    Relevant Orders    Ferritin    TSH with reflex to Free T4 if abnormal    Comprehensive Metabolic Panel    CBC and Auto Differential    Iron and TIBC    TINO with Reflex to KADEEM    Vitamin A    Vitamin B12    Vitamin B6    Vitamin C    Vitamin E    Vitamin K    Zinc, Serum or Plasma    Selenium, Blood    Vitamin B3 (Niacin and Metabolites)                 Deepak Art MD, LAc 04/04/25 5:04 PM

## 2025-04-04 NOTE — ASSESSMENT & PLAN NOTE
Please add in a quercetin supplement at 500 mg/day.  [employer] makes a good powder that is highly potent sized and would be easier to give through the J-tube.  The dose of that product would be 1/4 teaspoon/day in a single or divided dose.    https://www.[employer]/products/quercetin-dihydrate-powder?variant=[id number]&utm_source=[employer]&utm_medium=organic&utm_campaign=[place]%20[place]%20English%20Multifeeds&utm_content=Quercetin%20Dihydrate%20Powder&utm_campaign=[id number]&utm_source=[employer]&utm_medium=cpc&utm_content=&utm_term=&ad_id=[id number]&wickedsource=[employer]&wickedid=[id number]_BhD4ARIsAH1YdjCDgxiGHl1paljvQOuMW_wciE1MvjAYJA1nvsDvwNNVOfrO9GiZds4aAjeREALw_B&qnwqvjlk=[id number]&ktqn=[id number]&wv=4&gad_source=1&gclid=[id number]_BhD4ARIsAH1YdjCDgxiGHl1paljvQOuMW_wciE1MvjAYJA1nvsDvwNNVOfrO9GiZds4aAjeREALw_wcB

## 2025-04-16 LAB — PREALB SERPL-MCNC: 27 MG/DL (ref 17–34)

## 2025-04-20 LAB
25(OH)D3+25(OH)D2 SERPL-MCNC: 29 NG/ML (ref 30–100)
A-TOCOPHEROL VIT E SERPL-MCNC: 13.3 MG/L (ref 5.7–19.9)
ALBUMIN SERPL-MCNC: 4.8 G/DL (ref 3.6–5.1)
ALP SERPL-CCNC: 55 U/L (ref 31–125)
ALT SERPL-CCNC: 13 U/L (ref 6–29)
ANA SER QL IF: NEGATIVE
ANION GAP SERPL CALCULATED.4IONS-SCNC: 10 MMOL/L (CALC) (ref 7–17)
AST SERPL-CCNC: 20 U/L (ref 10–30)
BASOPHILS # BLD AUTO: 48 CELLS/UL (ref 0–200)
BASOPHILS NFR BLD AUTO: 0.8 %
BETA+GAMMA TOCOPHEROL SERPL-MCNC: <1 MG/L
BILIRUB SERPL-MCNC: 1.4 MG/DL (ref 0.2–1.2)
BUN SERPL-MCNC: 12 MG/DL (ref 7–25)
CALCIUM SERPL-MCNC: 9.9 MG/DL (ref 8.6–10.2)
CHLORIDE SERPL-SCNC: 101 MMOL/L (ref 98–110)
CO2 SERPL-SCNC: 27 MMOL/L (ref 20–32)
CREAT SERPL-MCNC: 0.78 MG/DL (ref 0.5–0.96)
EGFRCR SERPLBLD CKD-EPI 2021: 109 ML/MIN/1.73M2
EOSINOPHIL # BLD AUTO: 48 CELLS/UL (ref 15–500)
EOSINOPHIL NFR BLD AUTO: 0.8 %
ERYTHROCYTE [DISTWIDTH] IN BLOOD BY AUTOMATED COUNT: 12.1 % (ref 11–15)
FERRITIN SERPL-MCNC: 57 NG/ML (ref 16–154)
GLUCOSE SERPL-MCNC: 79 MG/DL (ref 65–99)
HCT VFR BLD AUTO: 34 % (ref 35–45)
HGB BLD-MCNC: 11.7 G/DL (ref 11.7–15.5)
IRON SATN MFR SERPL: 48 % (CALC) (ref 16–45)
IRON SERPL-MCNC: 149 MCG/DL (ref 40–190)
LYMPHOCYTES # BLD AUTO: 3186 CELLS/UL (ref 850–3900)
LYMPHOCYTES NFR BLD AUTO: 53.1 %
MCH RBC QN AUTO: 30.5 PG (ref 27–33)
MCHC RBC AUTO-ENTMCNC: 34.4 G/DL (ref 32–36)
MCV RBC AUTO: 88.5 FL (ref 80–100)
MONOCYTES # BLD AUTO: 294 CELLS/UL (ref 200–950)
MONOCYTES NFR BLD AUTO: 4.9 %
NEUTROPHILS # BLD AUTO: 2424 CELLS/UL (ref 1500–7800)
NEUTROPHILS NFR BLD AUTO: 40.4 %
NIACIN SERPL-MCNC: NORMAL NG/ML
NICOTINAMIDE SERPL-MCNC: NORMAL NG/ML
PHYTONADIONE SERPL-MCNC: 318 PG/ML (ref 130–1500)
PLATELET # BLD AUTO: 313 THOUSAND/UL (ref 140–400)
PMV BLD REES-ECKER: 10.3 FL (ref 7.5–12.5)
POTASSIUM SERPL-SCNC: 3.9 MMOL/L (ref 3.5–5.3)
PROT SERPL-MCNC: 7.8 G/DL (ref 6.1–8.1)
PYRIDOXAL PHOS SERPL-MCNC: 31.9 NG/ML (ref 2.1–21.7)
RBC # BLD AUTO: 3.84 MILLION/UL (ref 3.8–5.1)
SELENIUM SERPL-MCNC: 227 MCG/L (ref 63–160)
SODIUM SERPL-SCNC: 138 MMOL/L (ref 135–146)
TIBC SERPL-MCNC: 309 MCG/DL (CALC) (ref 250–450)
TSH SERPL-ACNC: 1.78 MIU/L
VIT A SERPL-MCNC: 54 MCG/DL (ref 38–98)
VIT B12 SERPL-MCNC: 789 PG/ML (ref 200–1100)
VIT C SERPL-MCNC: 1.7 MG/DL (ref 0.3–2.7)
WBC # BLD AUTO: 6 THOUSAND/UL (ref 3.8–10.8)
ZINC SERPL-MCNC: 75 MCG/DL (ref 60–130)

## 2025-04-22 LAB
25(OH)D3+25(OH)D2 SERPL-MCNC: 29 NG/ML (ref 30–100)
A-TOCOPHEROL VIT E SERPL-MCNC: 13.3 MG/L (ref 5.7–19.9)
ALBUMIN SERPL-MCNC: 4.8 G/DL (ref 3.6–5.1)
ALP SERPL-CCNC: 55 U/L (ref 31–125)
ALT SERPL-CCNC: 13 U/L (ref 6–29)
ANA SER QL IF: NEGATIVE
ANION GAP SERPL CALCULATED.4IONS-SCNC: 10 MMOL/L (CALC) (ref 7–17)
AST SERPL-CCNC: 20 U/L (ref 10–30)
BASOPHILS # BLD AUTO: 48 CELLS/UL (ref 0–200)
BASOPHILS NFR BLD AUTO: 0.8 %
BETA+GAMMA TOCOPHEROL SERPL-MCNC: <1 MG/L
BILIRUB SERPL-MCNC: 1.4 MG/DL (ref 0.2–1.2)
BUN SERPL-MCNC: 12 MG/DL (ref 7–25)
CALCIUM SERPL-MCNC: 9.9 MG/DL (ref 8.6–10.2)
CHLORIDE SERPL-SCNC: 101 MMOL/L (ref 98–110)
CO2 SERPL-SCNC: 27 MMOL/L (ref 20–32)
CREAT SERPL-MCNC: 0.78 MG/DL (ref 0.5–0.96)
EGFRCR SERPLBLD CKD-EPI 2021: 109 ML/MIN/1.73M2
EOSINOPHIL # BLD AUTO: 48 CELLS/UL (ref 15–500)
EOSINOPHIL NFR BLD AUTO: 0.8 %
ERYTHROCYTE [DISTWIDTH] IN BLOOD BY AUTOMATED COUNT: 12.1 % (ref 11–15)
FERRITIN SERPL-MCNC: 57 NG/ML (ref 16–154)
GLUCOSE SERPL-MCNC: 79 MG/DL (ref 65–99)
HCT VFR BLD AUTO: 34 % (ref 35–45)
HGB BLD-MCNC: 11.7 G/DL (ref 11.7–15.5)
IRON SATN MFR SERPL: 48 % (CALC) (ref 16–45)
IRON SERPL-MCNC: 149 MCG/DL (ref 40–190)
LYMPHOCYTES # BLD AUTO: 3186 CELLS/UL (ref 850–3900)
LYMPHOCYTES NFR BLD AUTO: 53.1 %
MCH RBC QN AUTO: 30.5 PG (ref 27–33)
MCHC RBC AUTO-ENTMCNC: 34.4 G/DL (ref 32–36)
MCV RBC AUTO: 88.5 FL (ref 80–100)
MONOCYTES # BLD AUTO: 294 CELLS/UL (ref 200–950)
MONOCYTES NFR BLD AUTO: 4.9 %
NEUTROPHILS # BLD AUTO: 2424 CELLS/UL (ref 1500–7800)
NEUTROPHILS NFR BLD AUTO: 40.4 %
NIACIN SERPL-MCNC: <20 NG/ML
NICOTINAMIDE SERPL-MCNC: <20 NG/ML
PHYTONADIONE SERPL-MCNC: 318 PG/ML (ref 130–1500)
PLATELET # BLD AUTO: 313 THOUSAND/UL (ref 140–400)
PMV BLD REES-ECKER: 10.3 FL (ref 7.5–12.5)
POTASSIUM SERPL-SCNC: 3.9 MMOL/L (ref 3.5–5.3)
PROT SERPL-MCNC: 7.8 G/DL (ref 6.1–8.1)
PYRIDOXAL PHOS SERPL-MCNC: 31.9 NG/ML (ref 2.1–21.7)
RBC # BLD AUTO: 3.84 MILLION/UL (ref 3.8–5.1)
SELENIUM SERPL-MCNC: 227 MCG/L (ref 63–160)
SODIUM SERPL-SCNC: 138 MMOL/L (ref 135–146)
TIBC SERPL-MCNC: 309 MCG/DL (CALC) (ref 250–450)
TSH SERPL-ACNC: 1.78 MIU/L
VIT A SERPL-MCNC: 54 MCG/DL (ref 38–98)
VIT B12 SERPL-MCNC: 789 PG/ML (ref 200–1100)
VIT C SERPL-MCNC: 1.7 MG/DL (ref 0.3–2.7)
WBC # BLD AUTO: 6 THOUSAND/UL (ref 3.8–10.8)
ZINC SERPL-MCNC: 75 MCG/DL (ref 60–130)

## 2025-05-08 ENCOUNTER — APPOINTMENT (OUTPATIENT)
Dept: INTEGRATIVE MEDICINE | Facility: CLINIC | Age: 25
End: 2025-05-08
Payer: COMMERCIAL

## 2025-05-09 ENCOUNTER — APPOINTMENT (OUTPATIENT)
Dept: INTEGRATIVE MEDICINE | Facility: HOSPITAL | Age: 25
End: 2025-05-09
Payer: COMMERCIAL

## 2025-06-20 ENCOUNTER — APPOINTMENT (OUTPATIENT)
Dept: INTEGRATIVE MEDICINE | Facility: HOSPITAL | Age: 25
End: 2025-06-20
Payer: COMMERCIAL

## 2025-07-03 ENCOUNTER — APPOINTMENT (OUTPATIENT)
Dept: INTEGRATIVE MEDICINE | Facility: CLINIC | Age: 25
End: 2025-07-03
Payer: COMMERCIAL

## 2025-07-03 DIAGNOSIS — D89.40 MAST CELL ACTIVATION SYNDROME: ICD-10-CM

## 2025-07-03 DIAGNOSIS — R11.0 CHRONIC NAUSEA: ICD-10-CM

## 2025-07-03 DIAGNOSIS — K31.84 GASTROPARESIS: ICD-10-CM

## 2025-07-03 DIAGNOSIS — G93.32 CHRONIC FATIGUE SYNDROME: ICD-10-CM

## 2025-07-03 DIAGNOSIS — K92.89 GASTROINTESTINAL DYSMOTILITY: ICD-10-CM

## 2025-07-03 DIAGNOSIS — R63.30 FEEDING DIFFICULTIES: ICD-10-CM

## 2025-07-03 DIAGNOSIS — Q79.60 EHLERS-DANLOS SYNDROME (HHS-HCC): ICD-10-CM

## 2025-07-03 DIAGNOSIS — G90.1 DYSAUTONOMIA (MULTI): ICD-10-CM

## 2025-07-03 DIAGNOSIS — E44.0 MALNUTRITION OF MODERATE DEGREE (MULTI): ICD-10-CM

## 2025-07-03 DIAGNOSIS — G90.A POTS (POSTURAL ORTHOSTATIC TACHYCARDIA SYNDROME): Primary | ICD-10-CM

## 2025-07-03 DIAGNOSIS — R11.10 CHRONIC VOMITING: ICD-10-CM

## 2025-07-03 PROCEDURE — 99213 OFFICE O/P EST LOW 20 MIN: CPT | Performed by: PEDIATRICS

## 2025-07-07 NOTE — PROGRESS NOTES
Subjective   Patient ID:   History of Present Illness  This note was created partially with the use of AI tools. Please forgive stylistic factors, but please do notify Dr. Art if factual errors are present.    Met with Osiris today for a check-in on status with a prior visit of 4/4/25.  She presents via virtual visit for evaluation of ME/CFS, gastrointestinal issues and nausea.    She has been experiencing a severe flare-up of her gastrointestinal issues, which she has been discussing with her other physicians. She is currently off TPN and relies on tube feeds through her J-tube, but can only tolerate a trickle feed of 10 mL per hour. Over the past few months, she has been dealing with nausea and vomiting, and no treatment has been found to be effective. She is under the care of a GI team at [facility] and has been diagnosed with gastroparesis. She has not undergone pyloric Botox treatment. She has tried metoclopramide and azithromycin as stimulants, but they have not provided any relief.    She is considering retrying LDN and has questions about its dosage and potential side effects. She is curious if LDN could help with her energy levels or pain. She is also interested in knowing the best time of day to take LDN. She has some LDN left from her previous prescription and is wondering if it is still effective. She is also seeking advice on how to store LDN and whether it can be administered through her J-tube. She is curious about the potential side effects of LDN.    She is also interested in knowing if there are any other suggestions for managing her nausea. She has tried Zofran and uses a scopolamine patch almost daily. She has also tried Emend and cyproheptadine. She recalls trying some herbal strategies about a year ago when she first met with this provider, but does not remember if they were effective.    She is considering resuming IV multivitamins, which she had briefly used when she was unable to  tolerate feeds or get any nutrition. She continues to receive IV fluids, sometimes daily or a few times a week, which she finds helpful. She is awaiting appointments with other doctors in the coming months. Venting her G-tube has been somewhat helpful in managing the vomiting.    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 She has moved back to [place] with her family and plans to start a new job in a few months.    MEDICATIONS  Current: Scopolamine patch, Zofran, Emend, cyproheptadine.  Past: Metoclopramide, azithromycin.      Physical Exam    Physical Exam  Mostly deferred due to telehealth.      Assessment & Plan  Gastrointestinal issues.  She has been experiencing a severe flare-up of her gastrointestinal issues, which she has been discussing with her other physicians.     -A detailed discussion was held regarding the potential benefits of LDN, including immune system stabilization, energy enhancement, pain reduction, and cognitive improvement. The most common side effects of LDN were also discussed, including anxiety, nausea, and vivid dreaming. It was recommended that she start with a low dose of LDN, gradually increasing it based on her tolerance and response. She was advised to maintain a journal to track her dosing and overall well-being.     Information on LDN:  LDN low dose naltrexone research https://ldnresearchtrust.org/    https://pmc.ncbi.nlm.nih.gov/articles/OGG68435556/  https://pubmed.ncbi.nlm.nih.gov/27516775/  https://www.ncbi.nlm.nih.gov/pmc/articles/BNG7779440/  https://www.ncbi.nlm.nih.gov/pmc/articles/GAP1144792/  https://www.ncbi.nlm.nih.gov/pmc/articles/FWF64000668/  https://www.ncbi.nlm.nih.gov/pmc/articles/TDO03485253/  https://www.jpain.org/article/-5237(82)33342-4/fulltext     LDN Dosing Instructions:   Start with 1/2 tablet by mouth at night before bed.  If there are no negative side-effects after 5 days, increase to 1 mg.  If  "again no negative side effects for 5 days, increase to 1.5mg, and so on, up to about 4.5mg.  What we're looking for, in addition to no problems, is improvements.  If slight improvements are noticed at one dose, but \"could be better\", keep going up.  At some point, patients, for whom this is effective, find a dose where they max out on improvements.  For example, you're at 3.0mg and doing well, so you go to 3.5mg and it's about the same.  In that case, we go back down to 3.0mg and that would be the dose we'd stay at for a while (total duration TBD).  If at any point in the process there are negative side effects on a dose (nausea, loose stool, anxiety, etc.), stay on that same dose until those go away.  Usually, those clear up in about 1-2 weeks.  The pharmacy in [place]:  https://www.[employer]/   [employer]  www.[employer] is your national source for compounding medication, education, and consultation for anti-aging and integrative therapies.   [phone]  [phone]    The use of Sea-bands on the pericardium 6 acupuncture point was suggested as a potential remedy for nausea. The possibility of resuming IV multivitamins was discussed, and she was encouraged to do so if her nutritional intake remains low. She was asked to provide an update on her current medications and supplements.  We discussed the possibility of using Chinese herbs and she will consider this.  The formulas Zamorano Sydney Wan and Andree Saundersu Shady Daigle are two possible options.    Deepak Art MD, LAc     This medical note was created with the assistance of artificial intelligence (AI) for documentation purposes. The content has been reviewed and confirmed by the healthcare provider for accuracy and completeness. Patient consented to the use of audio recording and use of AI during their visit.   "

## 2025-07-11 DIAGNOSIS — R41.89 BRAIN FOG: ICD-10-CM

## 2025-07-11 DIAGNOSIS — G90.A POTS (POSTURAL ORTHOSTATIC TACHYCARDIA SYNDROME): ICD-10-CM

## 2025-07-11 DIAGNOSIS — K31.89 GASTRIC DYSMOTILITY: ICD-10-CM

## 2025-07-11 RX ORDER — NALTREXONE HYDROCHLORIDE 50 MG/1
50 TABLET, FILM COATED ORAL DAILY
Qty: 12 TABLET | Refills: 0 | Status: SHIPPED | OUTPATIENT
Start: 2025-07-11 | End: 2025-07-23

## 2025-08-01 ENCOUNTER — APPOINTMENT (OUTPATIENT)
Dept: INTEGRATIVE MEDICINE | Facility: HOSPITAL | Age: 25
End: 2025-08-01
Payer: COMMERCIAL

## 2025-08-15 ENCOUNTER — APPOINTMENT (OUTPATIENT)
Dept: INTEGRATIVE MEDICINE | Facility: HOSPITAL | Age: 25
End: 2025-08-15
Payer: COMMERCIAL

## 2025-09-19 ENCOUNTER — APPOINTMENT (OUTPATIENT)
Dept: INTEGRATIVE MEDICINE | Facility: HOSPITAL | Age: 25
End: 2025-09-19
Payer: COMMERCIAL

## 2025-09-26 ENCOUNTER — APPOINTMENT (OUTPATIENT)
Dept: INTEGRATIVE MEDICINE | Facility: HOSPITAL | Age: 25
End: 2025-09-26
Payer: COMMERCIAL